# Patient Record
Sex: FEMALE | Race: BLACK OR AFRICAN AMERICAN | NOT HISPANIC OR LATINO | Employment: UNEMPLOYED | URBAN - METROPOLITAN AREA
[De-identification: names, ages, dates, MRNs, and addresses within clinical notes are randomized per-mention and may not be internally consistent; named-entity substitution may affect disease eponyms.]

---

## 2017-08-15 ENCOUNTER — OFFICE VISIT (OUTPATIENT)
Dept: URGENT CARE | Facility: CLINIC | Age: 52
End: 2017-08-15
Payer: COMMERCIAL

## 2017-08-15 VITALS
DIASTOLIC BLOOD PRESSURE: 88 MMHG | OXYGEN SATURATION: 100 % | HEART RATE: 71 BPM | TEMPERATURE: 98 F | SYSTOLIC BLOOD PRESSURE: 146 MMHG | RESPIRATION RATE: 16 BRPM

## 2017-08-15 DIAGNOSIS — K04.7 TOOTH ABSCESS: Primary | ICD-10-CM

## 2017-08-15 PROCEDURE — 99213 OFFICE O/P EST LOW 20 MIN: CPT | Mod: S$GLB,,, | Performed by: FAMILY MEDICINE

## 2017-08-15 RX ORDER — AMOXICILLIN 875 MG/1
875 TABLET, FILM COATED ORAL 2 TIMES DAILY
Qty: 20 TABLET | Refills: 0 | Status: SHIPPED | OUTPATIENT
Start: 2017-08-15 | End: 2017-08-25

## 2017-08-15 NOTE — PROGRESS NOTES
Subjective:       Patient ID: Cassandra Francis is a 52 y.o. female.    Vitals:  oral temperature is 98 °F (36.7 °C). Her blood pressure is 146/88 (abnormal) and her pulse is 71. Her respiration is 16 and oxygen saturation is 100%.     Chief Complaint: Dental Pain (PT C/O LEFT SIDE MAXILLARY PAIN, 2 DAYS, SWELLING, TENDER TO TOUCH, NO RECENT DENTAL PROCEDURES, STATED SHE HAD THE SAME SYMPTOMS 1 MONTH AGO AND WAS TX WITH ANTIBIOTICS, OTC MEDICATION NOT HELPING)    Dental Pain    This is a new problem. The current episode started yesterday. The problem occurs constantly. The problem has been unchanged. Pertinent negatives include no fever. She has tried NSAIDs for the symptoms. The treatment provided no relief.     Review of Systems   Constitution: Negative for chills and fever.   HENT: Negative for headaches and sore throat.    Eyes: Negative for blurred vision.   Cardiovascular: Negative for chest pain.   Respiratory: Negative for shortness of breath.    Skin: Negative for rash.   Musculoskeletal: Negative for back pain and joint pain.   Gastrointestinal: Negative for abdominal pain, diarrhea, nausea and vomiting.   Psychiatric/Behavioral: The patient is not nervous/anxious.        Objective:      Physical Exam   HENT:   Mouth/Throat: Uvula is midline, oropharynx is clear and moist and mucous membranes are normal. No oral lesions. No trismus in the jaw. Dental abscesses present. No uvula swelling or lacerations.           Assessment:       1. Tooth abscess        Plan:         Tooth abscess    Other orders  -     amoxicillin (AMOXIL) 875 MG tablet; Take 1 tablet (875 mg total) by mouth 2 (two) times daily.  Dispense: 20 tablet; Refill: 0

## 2017-12-13 ENCOUNTER — PATIENT OUTREACH (OUTPATIENT)
Dept: ADMINISTRATIVE | Facility: HOSPITAL | Age: 52
End: 2017-12-13

## 2017-12-13 NOTE — PROGRESS NOTES
Health Maintenance Due   Topic Date Due    Pap Smear with HPV Cotest  01/16/2017    Influenza Vaccine  08/01/2017       Pre-visit outreach via mail

## 2017-12-13 NOTE — LETTER
December 13, 2017    Cassandra Francis  [address]  [place] [address]             [facility]  [address]  [facility] [address]  Phone: [phone] Dear Ms. Francis:    [facility] is committed to your overall health.  To help you get the most out of each of your visits, we will review your information to make sure you are up to date on all of your recommended tests and/or procedures.      Dr. Erazo    has found that you may be due for:    Pap smear with HPV Cotest  Influenza vaccine    If you have had any of the above done at another facility, please bring the records or information with you so that your record at [facility] will be complete.     If you are currently taking medication, please bring it with you to your appointment for review.    If you have any questions or concerns, please don't hesitate to call.    Sincerely,    Jeannine Villagomez  Clinical Care Coordinator  [facility] [address] [facility] [address]  [place] [address]  Phone: [phone]   Fax: [phone]

## 2018-03-01 ENCOUNTER — PATIENT OUTREACH (OUTPATIENT)
Dept: ADMINISTRATIVE | Facility: HOSPITAL | Age: 53
End: 2018-03-01

## 2018-03-01 NOTE — PROGRESS NOTES
Health Maintenance Due   Topic Date Due    Pap Smear with HPV Cotest  01/16/2017    Influenza Vaccine  08/01/2017    Mammogram  02/22/2018     Pre-visit outreach via mail

## 2018-03-01 NOTE — LETTER
March 1, 2018    Cassandra Francis  [address]  [place] [address]             [facility]  [address]  [facility] [address]  Phone: [phone] Dear Ms. Francis:    [facility] is committed to your overall health.  To help you get the most out of each of your visits, we will review your information to make sure you are up to date on all of your recommended tests and/or procedures.      Dr. Erazo       has found that you may be due for:    Pap smear with HPV Cotest  Mammogram  Influenza vaccine    If you have had any of the above done at another facility, please bring the records or information with you so that your record at [facility] will be complete.     If you are currently taking medication, please bring it with you to your appointment for review.    If you have any questions or concerns, please don't hesitate to call.    Sincerely,    Jeannine Villagomez  Clinical Care Coordinator  [facility] [address] [facility] [address]  [place] [address]  Phone: [phone]   Fax: [phone]

## 2018-03-15 ENCOUNTER — LAB VISIT (OUTPATIENT)
Dept: LAB | Facility: HOSPITAL | Age: 53
End: 2018-03-15
Attending: FAMILY MEDICINE
Payer: COMMERCIAL

## 2018-03-15 ENCOUNTER — OFFICE VISIT (OUTPATIENT)
Dept: FAMILY MEDICINE | Facility: CLINIC | Age: 53
End: 2018-03-15
Payer: COMMERCIAL

## 2018-03-15 VITALS
BODY MASS INDEX: 33.59 KG/M2 | HEART RATE: 90 BPM | RESPIRATION RATE: 18 BRPM | WEIGHT: 177.94 LBS | SYSTOLIC BLOOD PRESSURE: 120 MMHG | TEMPERATURE: 98 F | OXYGEN SATURATION: 98 % | DIASTOLIC BLOOD PRESSURE: 80 MMHG | HEIGHT: 61 IN

## 2018-03-15 DIAGNOSIS — Z00.00 PREVENTATIVE HEALTH CARE: ICD-10-CM

## 2018-03-15 DIAGNOSIS — Z00.00 PREVENTATIVE HEALTH CARE: Primary | ICD-10-CM

## 2018-03-15 LAB
ALBUMIN SERPL BCP-MCNC: 3.8 G/DL
ALP SERPL-CCNC: 114 U/L
ALT SERPL W/O P-5'-P-CCNC: 20 U/L
ANION GAP SERPL CALC-SCNC: 9 MMOL/L
AST SERPL-CCNC: 19 U/L
BASOPHILS # BLD AUTO: 0.05 K/UL
BASOPHILS NFR BLD: 0.6 %
BILIRUB SERPL-MCNC: 0.5 MG/DL
BUN SERPL-MCNC: 6 MG/DL
CALCIUM SERPL-MCNC: 10.3 MG/DL
CHLORIDE SERPL-SCNC: 104 MMOL/L
CHOLEST SERPL-MCNC: 214 MG/DL
CHOLEST/HDLC SERPL: 3.3 {RATIO}
CO2 SERPL-SCNC: 29 MMOL/L
CREAT SERPL-MCNC: 0.8 MG/DL
DIFFERENTIAL METHOD: NORMAL
EOSINOPHIL # BLD AUTO: 0.1 K/UL
EOSINOPHIL NFR BLD: 1.2 %
ERYTHROCYTE [DISTWIDTH] IN BLOOD BY AUTOMATED COUNT: 12.1 %
EST. GFR  (AFRICAN AMERICAN): >60 ML/MIN/1.73 M^2
EST. GFR  (NON AFRICAN AMERICAN): >60 ML/MIN/1.73 M^2
ESTIMATED AVG GLUCOSE: 105 MG/DL
GLUCOSE SERPL-MCNC: 104 MG/DL
HBA1C MFR BLD HPLC: 5.3 %
HCT VFR BLD AUTO: 38.1 %
HDLC SERPL-MCNC: 64 MG/DL
HDLC SERPL: 29.9 %
HGB BLD-MCNC: 12.7 G/DL
IMM GRANULOCYTES # BLD AUTO: 0.02 K/UL
IMM GRANULOCYTES NFR BLD AUTO: 0.2 %
LDLC SERPL CALC-MCNC: 128.4 MG/DL
LYMPHOCYTES # BLD AUTO: 3 K/UL
LYMPHOCYTES NFR BLD: 34.6 %
MCH RBC QN AUTO: 30.4 PG
MCHC RBC AUTO-ENTMCNC: 33.3 G/DL
MCV RBC AUTO: 91 FL
MONOCYTES # BLD AUTO: 0.5 K/UL
MONOCYTES NFR BLD: 5.3 %
NEUTROPHILS # BLD AUTO: 5.1 K/UL
NEUTROPHILS NFR BLD: 58.1 %
NONHDLC SERPL-MCNC: 150 MG/DL
NRBC BLD-RTO: 0 /100 WBC
PLATELET # BLD AUTO: 313 K/UL
PMV BLD AUTO: 11.4 FL
POTASSIUM SERPL-SCNC: 4.4 MMOL/L
PROT SERPL-MCNC: 7.5 G/DL
RBC # BLD AUTO: 4.18 M/UL
SODIUM SERPL-SCNC: 142 MMOL/L
TRIGL SERPL-MCNC: 108 MG/DL
WBC # BLD AUTO: 8.69 K/UL

## 2018-03-15 PROCEDURE — 99396 PREV VISIT EST AGE 40-64: CPT | Mod: S$GLB,,, | Performed by: FAMILY MEDICINE

## 2018-03-15 PROCEDURE — 36415 COLL VENOUS BLD VENIPUNCTURE: CPT | Mod: PO

## 2018-03-15 PROCEDURE — 99999 PR PBB SHADOW E&M-EST. PATIENT-LVL III: CPT | Mod: PBBFAC,,, | Performed by: FAMILY MEDICINE

## 2018-03-15 PROCEDURE — 80053 COMPREHEN METABOLIC PANEL: CPT

## 2018-03-15 PROCEDURE — 83036 HEMOGLOBIN GLYCOSYLATED A1C: CPT

## 2018-03-15 PROCEDURE — 80061 LIPID PANEL: CPT

## 2018-03-15 PROCEDURE — 85025 COMPLETE CBC W/AUTO DIFF WBC: CPT

## 2018-03-15 NOTE — PROGRESS NOTES
Subjective:       Patient ID: Cassandra Francis is a 53 y.o. female.    Chief Complaint: Preventative health care    Pt here for annual visit.  Has been well without complaints.   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  Pt follows with gynecologist, Dr. Dev Valencia for pap and mammogram.    No past medical history on file.    Past Surgical History:     SECTION, CLASSIC                                      No Known Allergies    Social History    Marital Status:                       Occupational History  Occupation          Employer            Comment               not employed                                Social History Main Topics    Smoking Status: Never Smoker                      Smokeless Status: Never Used                        Alcohol Use: No              Drug Use: No              Sexual Activity: Not on file          Current Outpatient Prescriptions on File Prior to Visit:  MULTIVITAMINS-MIN/FA/GINKGO (WOMEN'S 50+ DAILY FORMULA ORAL), Take by mouth., Disp: , Rfl:     No current facility-administered medications on file prior to visit.    No family history on file.        Review of Systems   Constitutional: Negative for activity change, appetite change, fatigue and unexpected weight change.   HENT: Negative for congestion, dental problem, ear pain, hearing loss, nosebleeds, postnasal drip, sinus pressure and tinnitus.    Eyes: Negative for pain, discharge and visual disturbance.   Respiratory: Negative for cough, choking, chest tightness, shortness of breath and wheezing.    Cardiovascular: Negative for chest pain, palpitations and leg swelling.   Gastrointestinal: Negative for abdominal distention, abdominal pain, blood in stool, constipation, diarrhea, nausea and vomiting.             Genitourinary: Negative.    Musculoskeletal: Negative for arthralgias, back pain, gait problem, joint swelling and myalgias.   Skin: Negative for color change, pallor, rash and wound.  "  Neurological: Negative for dizziness, tremors, syncope, weakness, light-headedness, numbness and headaches.   Hematological: Negative for adenopathy. Does not bruise/bleed easily.   Psychiatric/Behavioral: Negative for agitation, confusion, dysphoric mood and sleep disturbance. The patient is not nervous/anxious.        Current Outpatient Prescriptions on File Prior to Visit   Medication Sig Dispense Refill    MULTIVITAMINS-MIN/FA/GINKGO (WOMEN'S 50+ DAILY FORMULA ORAL) Take by mouth.       No current facility-administered medications on file prior to visit.        Objective:      /80   Pulse 90   Temp 97.9 °F (36.6 °C)   Resp 18   Ht 5' 1" (1.549 m)   Wt 80.7 kg (177 lb 14.6 oz)   LMP 05/14/2012   SpO2 98%   BMI 33.62 kg/m²     Physical Exam   Constitutional: She is oriented to person, place, and time. She appears well-developed and well-nourished.   HENT:   Head: Normocephalic.   Mouth/Throat: Oropharynx is clear and moist. No oropharyngeal exudate or posterior oropharyngeal erythema.   Eyes: Conjunctivae and EOM are normal. Pupils are equal, round, and reactive to light.   Neck: Normal range of motion. Neck supple. No thyromegaly present.   Cardiovascular: Normal rate, regular rhythm, S1 normal, S2 normal, normal heart sounds and intact distal pulses.  Exam reveals no gallop and no friction rub.    No murmur heard.  Pulmonary/Chest: Effort normal and breath sounds normal. She has no wheezes. She has no rales.   Abdominal: Soft. Normal appearance and bowel sounds are normal. She exhibits no distension and no mass. There is no tenderness. There is no rebound and no guarding.   Musculoskeletal:        Right lower leg: She exhibits no edema.        Left lower leg: She exhibits no edema.   Lymphadenopathy:     She has no cervical adenopathy.   Neurological: She is alert and oriented to person, place, and time. No cranial nerve deficit. Gait normal.   Skin: Skin is warm and intact. No rash noted. "   Psychiatric: She has a normal mood and affect.         Results for orders placed or performed in visit on 12/23/16   Comprehensive metabolic panel   Result Value Ref Range    Sodium 142 136 - 145 mmol/L    Potassium 3.8 3.5 - 5.1 mmol/L    Chloride 105 95 - 110 mmol/L    CO2 29 23 - 29 mmol/L    Glucose 102 70 - 110 mg/dL    BUN, Bld 8 6 - 20 mg/dL    Creatinine 0.7 0.5 - 1.4 mg/dL    Calcium 9.9 8.7 - 10.5 mg/dL    Total Protein 7.4 6.0 - 8.4 g/dL    Albumin 3.7 3.5 - 5.2 g/dL    Total Bilirubin 0.5 0.1 - 1.0 mg/dL    Alkaline Phosphatase 104 55 - 135 U/L    AST 16 10 - 40 U/L    ALT 14 10 - 44 U/L    Anion Gap 8 8 - 16 mmol/L    eGFR if African American >60.0 >60 mL/min/1.73 m^2    eGFR if non African American >60.0 >60 mL/min/1.73 m^2   TSH   Result Value Ref Range    TSH 1.825 0.400 - 4.000 uIU/mL   Lipid panel   Result Value Ref Range    Cholesterol 223 (H) 120 - 199 mg/dL    Triglycerides 91 30 - 150 mg/dL    HDL 72 40 - 75 mg/dL    LDL Cholesterol 132.8 63.0 - 159.0 mg/dL    HDL/Chol Ratio 32.3 20.0 - 50.0 %    Total Cholesterol/HDL Ratio 3.1 2.0 - 5.0    Non-HDL Cholesterol 151 mg/dL   CBC auto differential   Result Value Ref Range    WBC 7.21 3.90 - 12.70 K/uL    RBC 3.97 (L) 4.00 - 5.40 M/uL    Hemoglobin 12.1 12.0 - 16.0 g/dL    Hematocrit 36.5 (L) 37.0 - 48.5 %    MCV 92 82 - 98 fL    MCH 30.5 27.0 - 31.0 pg    MCHC 33.2 32.0 - 36.0 %    RDW 13.4 11.5 - 14.5 %    Platelets 321 150 - 350 K/uL    MPV 10.9 9.2 - 12.9 fL    Gran # (ANC) 3.6 1.8 - 7.7 K/uL    Lymph # 3.1 1.0 - 4.8 K/uL    Mono # 0.4 0.3 - 1.0 K/uL    Eos # 0.1 0.0 - 0.5 K/uL    Baso # 0.03 0.00 - 0.20 K/uL    Gran% 50.0 38.0 - 73.0 %    Lymph% 42.6 18.0 - 48.0 %    Mono% 5.3 4.0 - 15.0 %    Eosinophil% 1.4 0.0 - 8.0 %    Basophil% 0.4 0.0 - 1.9 %    Differential Method Automated    Hemoglobin A1c   Result Value Ref Range    Hemoglobin A1C 5.4 4.5 - 6.2 %    Estimated Avg Glucose 108 68 - 131 mg/dL   Hepatitis C antibody   Result Value Ref  Range    Hepatitis C Ab Negative      Assessment:          Plan:        Cassandra was seen today for preventative health care.    Diagnoses and all orders for this visit:    Preventative health care  -     CBC auto differential; Future  -     Comprehensive metabolic panel; Future  -     Lipid panel; Future  -     Hemoglobin A1c; Future       labs today  Counseled on regular exercise, maintenance of a healthy weight, balanced diet rich in fruits/vegetables and lean protein, and avoidance of unhealthy habits like smoking and excessive alcohol intake.

## 2018-03-16 ENCOUNTER — TELEPHONE (OUTPATIENT)
Dept: ADMINISTRATIVE | Facility: HOSPITAL | Age: 53
End: 2018-03-16

## 2018-03-16 LAB — PAP: NORMAL

## 2018-03-16 NOTE — LETTER
March 16, 2018    Dev Valencia MD             [facility]  [address]  [facility] [address]  Phone: [phone] March 16, 2018     Patient: Cassandra Francis    YOB: 1965   Date of Visit: 3/16/2018       To Whom It May Concern:      [facility]    We are seeing Cassandra Francis in the clinic today at [facility].  Haile Erazo MD is their PCP.  She/He has an outstanding lab/procedure at this time when reviewing their chart.  To help with our Health Maintenance records will you please supply the following:      [x]  Pap Smear                                                                                                  Please Fax to [facility] at [phone]    Thank you for your help, Jeannine Villagomez LPN-[facility].  If I can be of any assistance you can call at [phone]      If you have any questions or concerns, please don't hesitate to call.    Sincerely,        Haile Erazo MD

## 2018-03-16 NOTE — LETTER
March 20, 2018    Cassandra Francis  [address]  [place] [address]             [facility]  [address]  [facility] [address]  Phone: [phone] Dear Ms. Francis:    [facility] is committed to your overall health.  To help you get the most out of each of your visits, we will review your information to make sure you are up to date on all of your recommended tests and/or procedures.      Dr. Erazo had me request your Pap smear records from Dr. Valencia, but have yet to receive anything from them.  Sometimes these facilities require a signed release before they will share records.  If you have already signed a release of information form, would you mind contacting them to send us the records.  If not, the next time you are in the office, we will have you sign a Release of Information and try again.      Please contact his office  and request records to be faxed to ( Fax: [phone])        If you have any questions or concerns, please don't hesitate to call.    Sincerely,    Jeannine Villagomez  Clinical Care Coordinator  [facility]  [address] [facility] [address]  [place] [address]  Phone: [phone]   Fax: [phone]

## 2018-03-26 ENCOUNTER — TELEPHONE (OUTPATIENT)
Dept: FAMILY MEDICINE | Facility: CLINIC | Age: 53
End: 2018-03-26

## 2018-03-26 NOTE — TELEPHONE ENCOUNTER
----- Message from Mabel Cage sent at 3/26/2018 10:25 AM CDT -----  Contact: self  Patient is requesting the results of her labs that she had done a couple of weeks ago.  Call back at [phone] (home).  Thank you!

## 2019-10-24 ENCOUNTER — OFFICE VISIT (OUTPATIENT)
Dept: FAMILY MEDICINE | Facility: CLINIC | Age: 54
End: 2019-10-24
Payer: COMMERCIAL

## 2019-10-24 ENCOUNTER — LAB VISIT (OUTPATIENT)
Dept: LAB | Facility: HOSPITAL | Age: 54
End: 2019-10-24
Attending: FAMILY MEDICINE

## 2019-10-24 VITALS
OXYGEN SATURATION: 97 % | HEART RATE: 75 BPM | HEIGHT: 61 IN | TEMPERATURE: 98 F | DIASTOLIC BLOOD PRESSURE: 80 MMHG | WEIGHT: 169.75 LBS | BODY MASS INDEX: 32.05 KG/M2 | SYSTOLIC BLOOD PRESSURE: 124 MMHG

## 2019-10-24 DIAGNOSIS — Z00.00 PREVENTATIVE HEALTH CARE: ICD-10-CM

## 2019-10-24 DIAGNOSIS — Z00.00 PREVENTATIVE HEALTH CARE: Primary | ICD-10-CM

## 2019-10-24 LAB
ALBUMIN SERPL BCP-MCNC: 4 G/DL (ref 3.5–5.2)
ALP SERPL-CCNC: 109 U/L (ref 55–135)
ALT SERPL W/O P-5'-P-CCNC: 11 U/L (ref 10–44)
ANION GAP SERPL CALC-SCNC: 9 MMOL/L (ref 8–16)
AST SERPL-CCNC: 16 U/L (ref 10–40)
BASOPHILS # BLD AUTO: 0.04 K/UL (ref 0–0.2)
BASOPHILS NFR BLD: 0.6 % (ref 0–1.9)
BILIRUB SERPL-MCNC: 0.4 MG/DL (ref 0.1–1)
BUN SERPL-MCNC: 6 MG/DL (ref 6–20)
CALCIUM SERPL-MCNC: 10.3 MG/DL (ref 8.7–10.5)
CHLORIDE SERPL-SCNC: 104 MMOL/L (ref 95–110)
CHOLEST SERPL-MCNC: 216 MG/DL (ref 120–199)
CHOLEST/HDLC SERPL: 3.5 {RATIO} (ref 2–5)
CO2 SERPL-SCNC: 29 MMOL/L (ref 23–29)
CREAT SERPL-MCNC: 0.8 MG/DL (ref 0.5–1.4)
DIFFERENTIAL METHOD: NORMAL
EOSINOPHIL # BLD AUTO: 0.1 K/UL (ref 0–0.5)
EOSINOPHIL NFR BLD: 0.9 % (ref 0–8)
ERYTHROCYTE [DISTWIDTH] IN BLOOD BY AUTOMATED COUNT: 12.8 % (ref 11.5–14.5)
EST. GFR  (AFRICAN AMERICAN): >60 ML/MIN/1.73 M^2
EST. GFR  (NON AFRICAN AMERICAN): >60 ML/MIN/1.73 M^2
ESTIMATED AVG GLUCOSE: 108 MG/DL (ref 68–131)
GLUCOSE SERPL-MCNC: 96 MG/DL (ref 70–110)
HBA1C MFR BLD HPLC: 5.4 % (ref 4–5.6)
HCT VFR BLD AUTO: 41.3 % (ref 37–48.5)
HDLC SERPL-MCNC: 62 MG/DL (ref 40–75)
HDLC SERPL: 28.7 % (ref 20–50)
HGB BLD-MCNC: 13.2 G/DL (ref 12–16)
IMM GRANULOCYTES # BLD AUTO: 0.02 K/UL (ref 0–0.04)
IMM GRANULOCYTES NFR BLD AUTO: 0.3 % (ref 0–0.5)
LDLC SERPL CALC-MCNC: 130.4 MG/DL (ref 63–159)
LYMPHOCYTES # BLD AUTO: 2.3 K/UL (ref 1–4.8)
LYMPHOCYTES NFR BLD: 32.8 % (ref 18–48)
MCH RBC QN AUTO: 29.9 PG (ref 27–31)
MCHC RBC AUTO-ENTMCNC: 32 G/DL (ref 32–36)
MCV RBC AUTO: 94 FL (ref 82–98)
MONOCYTES # BLD AUTO: 0.3 K/UL (ref 0.3–1)
MONOCYTES NFR BLD: 4 % (ref 4–15)
NEUTROPHILS # BLD AUTO: 4.3 K/UL (ref 1.8–7.7)
NEUTROPHILS NFR BLD: 61.4 % (ref 38–73)
NONHDLC SERPL-MCNC: 154 MG/DL
NRBC BLD-RTO: 0 /100 WBC
PLATELET # BLD AUTO: 319 K/UL (ref 150–350)
PMV BLD AUTO: 11.6 FL (ref 9.2–12.9)
POTASSIUM SERPL-SCNC: 3.9 MMOL/L (ref 3.5–5.1)
PROT SERPL-MCNC: 7.9 G/DL (ref 6–8.4)
RBC # BLD AUTO: 4.41 M/UL (ref 4–5.4)
SODIUM SERPL-SCNC: 142 MMOL/L (ref 136–145)
TRIGL SERPL-MCNC: 118 MG/DL (ref 30–150)
TSH SERPL DL<=0.005 MIU/L-ACNC: 1.21 UIU/ML (ref 0.4–4)
WBC # BLD AUTO: 6.96 K/UL (ref 3.9–12.7)

## 2019-10-24 PROCEDURE — 36415 COLL VENOUS BLD VENIPUNCTURE: CPT | Mod: PO

## 2019-10-24 PROCEDURE — 99999 PR PBB SHADOW E&M-EST. PATIENT-LVL III: ICD-10-PCS | Mod: PBBFAC,,, | Performed by: FAMILY MEDICINE

## 2019-10-24 PROCEDURE — 99396 PREV VISIT EST AGE 40-64: CPT | Mod: S$GLB,,, | Performed by: FAMILY MEDICINE

## 2019-10-24 PROCEDURE — 80061 LIPID PANEL: CPT

## 2019-10-24 PROCEDURE — 99396 PR PREVENTIVE VISIT,EST,40-64: ICD-10-PCS | Mod: S$GLB,,, | Performed by: FAMILY MEDICINE

## 2019-10-24 PROCEDURE — 85025 COMPLETE CBC W/AUTO DIFF WBC: CPT

## 2019-10-24 PROCEDURE — 99999 PR PBB SHADOW E&M-EST. PATIENT-LVL III: CPT | Mod: PBBFAC,,, | Performed by: FAMILY MEDICINE

## 2019-10-24 PROCEDURE — 80053 COMPREHEN METABOLIC PANEL: CPT

## 2019-10-24 PROCEDURE — 84443 ASSAY THYROID STIM HORMONE: CPT

## 2019-10-24 PROCEDURE — 83036 HEMOGLOBIN GLYCOSYLATED A1C: CPT

## 2019-10-24 NOTE — PROGRESS NOTES
Subjective:       Patient ID: Cassandra Francis is a 54 y.o. female.    Chief Complaint: Annual Exam    Pt here for annual visit.  Has been well without complaints.     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  Pt follows with gynecologist, Dr. Dev Valencia for pap and mammogram.    No past medical history on file.    Past Surgical History:     SECTION, CLASSIC                                      No Known Allergies    Social History    Marital Status:                       Occupational History  Occupation          Employer            Comment               not employed                                Social History Main Topics    Smoking Status: Never Smoker                      Smokeless Status: Never Used                        Alcohol Use: No              Drug Use: No              Sexual Activity: Not on file          Current Outpatient Prescriptions on File Prior to Visit:  MULTIVITAMINS-MIN/FA/GINKGO (WOMEN'S 50+ DAILY FORMULA ORAL), Take by mouth., Disp: , Rfl:     No current facility-administered medications on file prior to visit.    No family history on file.      Review of Systems   Constitutional: Negative for activity change, appetite change, fatigue and unexpected weight change.   HENT: Negative for congestion, dental problem, ear pain, hearing loss, nosebleeds, postnasal drip, sinus pressure and tinnitus.    Eyes: Negative for pain, discharge and visual disturbance.   Respiratory: Negative for cough, choking, chest tightness, shortness of breath and wheezing.    Cardiovascular: Negative for chest pain, palpitations and leg swelling.   Gastrointestinal: Negative for abdominal distention, abdominal pain, blood in stool, constipation, diarrhea, nausea and vomiting.             Genitourinary: Negative.    Musculoskeletal: Negative for arthralgias, back pain, gait problem, joint swelling and myalgias.   Skin: Negative for color change, pallor, rash and wound.  "  Neurological: Negative for dizziness, tremors, syncope, weakness, light-headedness, numbness and headaches.   Hematological: Negative for adenopathy. Does not bruise/bleed easily.   Psychiatric/Behavioral: Negative for agitation, confusion, dysphoric mood and sleep disturbance. The patient is not nervous/anxious.        Current Outpatient Medications on File Prior to Visit   Medication Sig Dispense Refill    MULTIVITAMINS-MIN/FA/GINKGO (WOMEN'S 50+ DAILY FORMULA ORAL) Take by mouth.       No current facility-administered medications on file prior to visit.        Objective:      /80   Pulse 75   Temp 98.2 °F (36.8 °C) (Oral)   Ht 5' 1" (1.549 m)   Wt 77 kg (169 lb 12.1 oz)   LMP 05/14/2012   SpO2 97%   BMI 32.07 kg/m²     Physical Exam   Constitutional: She is oriented to person, place, and time. She appears well-developed and well-nourished.   HENT:   Head: Normocephalic.   Mouth/Throat: Oropharynx is clear and moist. No oropharyngeal exudate or posterior oropharyngeal erythema.   Eyes: Pupils are equal, round, and reactive to light. Conjunctivae and EOM are normal.   Neck: Normal range of motion. Neck supple. No thyromegaly present.   Cardiovascular: Normal rate, regular rhythm, S1 normal, S2 normal, normal heart sounds and intact distal pulses. Exam reveals no gallop and no friction rub.   No murmur heard.  Pulmonary/Chest: Effort normal and breath sounds normal. She has no wheezes. She has no rales.   Abdominal: Soft. Normal appearance and bowel sounds are normal. She exhibits no distension and no mass. There is no tenderness. There is no rebound and no guarding.   Musculoskeletal:        Right lower leg: She exhibits no edema.        Left lower leg: She exhibits no edema.   Lymphadenopathy:     She has no cervical adenopathy.   Neurological: She is alert and oriented to person, place, and time. No cranial nerve deficit. Gait normal.   Skin: Skin is warm and intact. No rash noted.   Psychiatric: " She has a normal mood and affect.         Results for orders placed or performed in visit on 03/16/18    PAP SMEAR   Result Value Ref Range    PAP       Assessment:          Plan:        Cassandra was seen today for annual exam.    Diagnoses and all orders for this visit:    Preventative health care  -     Comprehensive metabolic panel; Future  -     Lipid panel; Future  -     Hemoglobin A1c; Future  -     TSH; Future  -     CBC auto differential; Future     shingrix at phamacy  labs today  Counseled on regular exercise, maintenance of a healthy weight, balanced diet rich in fruits/vegetables and lean protein, and avoidance of unhealthy habits like smoking and excessive alcohol intake.

## 2019-10-25 ENCOUNTER — TELEPHONE (OUTPATIENT)
Dept: FAMILY MEDICINE | Facility: CLINIC | Age: 54
End: 2019-10-25

## 2019-10-25 NOTE — TELEPHONE ENCOUNTER
Call placed to [employer], and then to patient, advised that we do take [employer] and she will still be seen by Dr Erazo.

## 2019-10-25 NOTE — TELEPHONE ENCOUNTER
----- Message from Merced Ring sent at 10/25/2019  2:04 PM CDT -----    Type: Needs Medical Advice    Who Called:   Haven Cobb with    Rep  for [employer]   Best Call Back Number: [phone]  Additional Information:   Calling to  See if  The  Dr  Will  Be taking  [employer]  Or will  Their  Be a   Change   // unable to  Find   The Dr in  Contract

## 2019-10-31 ENCOUNTER — TELEPHONE (OUTPATIENT)
Dept: FAMILY MEDICINE | Facility: CLINIC | Age: 54
End: 2019-10-31

## 2019-10-31 NOTE — TELEPHONE ENCOUNTER
----- Message from Maria Teresa Jean sent at 10/31/2019  1:14 PM CDT -----  Contact: patient   Patient need to speak with a nurse regarding results, please call back at [phone] (home)

## 2019-11-13 ENCOUNTER — TELEPHONE (OUTPATIENT)
Dept: FAMILY MEDICINE | Facility: CLINIC | Age: 54
End: 2019-11-13

## 2019-11-13 NOTE — TELEPHONE ENCOUNTER
Patient needs labs mailed to her.   Please print labs from 10/24 and place those in the mail.

## 2020-02-29 ENCOUNTER — OFFICE VISIT (OUTPATIENT)
Dept: URGENT CARE | Facility: CLINIC | Age: 55
End: 2020-02-29
Payer: COMMERCIAL

## 2020-02-29 VITALS
DIASTOLIC BLOOD PRESSURE: 93 MMHG | SYSTOLIC BLOOD PRESSURE: 162 MMHG | RESPIRATION RATE: 16 BRPM | HEIGHT: 61 IN | BODY MASS INDEX: 31.91 KG/M2 | WEIGHT: 169 LBS | HEART RATE: 84 BPM | OXYGEN SATURATION: 99 % | TEMPERATURE: 99 F

## 2020-02-29 DIAGNOSIS — N30.00 ACUTE CYSTITIS WITHOUT HEMATURIA: Primary | ICD-10-CM

## 2020-02-29 LAB
BILIRUB UR QL STRIP: NEGATIVE
GLUCOSE UR QL STRIP: NEGATIVE
KETONES UR QL STRIP: NEGATIVE
LEUKOCYTE ESTERASE UR QL STRIP: POSITIVE
PH, POC UA: 8 (ref 5–8)
POC BLOOD, URINE: NEGATIVE
POC NITRATES, URINE: NEGATIVE
PROT UR QL STRIP: NEGATIVE
SP GR UR STRIP: 1 (ref 1–1.03)
UROBILINOGEN UR STRIP-ACNC: NORMAL (ref 0.1–1.1)

## 2020-02-29 PROCEDURE — 81003 POCT URINALYSIS, DIPSTICK, AUTOMATED, W/O SCOPE: ICD-10-PCS | Mod: QW,S$GLB,, | Performed by: FAMILY MEDICINE

## 2020-02-29 PROCEDURE — 99214 OFFICE O/P EST MOD 30 MIN: CPT | Mod: 25,S$GLB,, | Performed by: FAMILY MEDICINE

## 2020-02-29 PROCEDURE — 99214 PR OFFICE/OUTPT VISIT, EST, LEVL IV, 30-39 MIN: ICD-10-PCS | Mod: 25,S$GLB,, | Performed by: FAMILY MEDICINE

## 2020-02-29 PROCEDURE — 81003 URINALYSIS AUTO W/O SCOPE: CPT | Mod: QW,S$GLB,, | Performed by: FAMILY MEDICINE

## 2020-02-29 RX ORDER — NITROFURANTOIN 25; 75 MG/1; MG/1
100 CAPSULE ORAL 2 TIMES DAILY
Qty: 10 CAPSULE | Refills: 0 | Status: SHIPPED | OUTPATIENT
Start: 2020-02-29 | End: 2020-03-05

## 2020-02-29 NOTE — PATIENT INSTRUCTIONS

## 2020-02-29 NOTE — PROGRESS NOTES
"Subjective:       Patient ID: Cassandra Francis is a 55 y.o. female.    Vitals:  height is 5' 1" (1.549 m) and weight is 76.7 kg (169 lb). Her oral temperature is 98.7 °F (37.1 °C). Her blood pressure is 162/93 (abnormal) and her pulse is 84. Her respiration is 16 and oxygen saturation is 99%.     Chief Complaint: Urinary Tract Infection    Pt present today with UTI, pt stated that it burns when using the Bathroom and she noticed a little blood in her urine. Symptoms started last night.     Urinary Tract Infection    This is a new problem. The current episode started yesterday. The problem has been gradually worsening. The quality of the pain is described as burning. The pain is at a severity of 4/10. The pain is mild. There has been no fever. Associated symptoms include flank pain. Pertinent negatives include no behavior changes, chills, discharge, frequency, hematuria, hesitancy, nausea, possible pregnancy, sweats, urgency, vomiting, weight loss, bubble bath use, constipation, rash or withholding. She has tried nothing for the symptoms. There is no history of catheterization, diabetes insipidus, diabetes mellitus, genitourinary reflux, hypertension, kidney stones, recurrent UTIs, a single kidney, STD, urinary stasis or a urological procedure.       Constitution: Negative for chills.   Gastrointestinal: Negative for nausea, vomiting and constipation.   Genitourinary: Positive for dysuria and flank pain. Negative for frequency, urgency and hematuria.   Skin: Negative for rash.       Objective:      Physical Exam   Constitutional: She is oriented to person, place, and time. She appears well-developed and well-nourished.   HENT:   Head: Normocephalic and atraumatic.   Right Ear: External ear normal.   Left Ear: External ear normal.   Nose: Nose normal.   Mouth/Throat: Mucous membranes are normal.   Eyes: Conjunctivae and lids are normal.   Neck: Trachea normal and full passive range of motion without pain. Neck supple. "   Cardiovascular: Normal rate, regular rhythm and normal heart sounds.   Pulmonary/Chest: Effort normal and breath sounds normal. No respiratory distress.   Abdominal: Soft. Normal appearance and bowel sounds are normal. She exhibits no distension, no abdominal bruit, no pulsatile midline mass and no mass. There is no tenderness.   Mild direct suprapubic tenderness without rebound   Musculoskeletal: Normal range of motion. She exhibits no edema.   Neurological: She is alert and oriented to person, place, and time. She has normal strength.   Skin: Skin is warm, dry, intact, not diaphoretic and not pale.   Psychiatric: She has a normal mood and affect. Her speech is normal and behavior is normal. Judgment and thought content normal. Cognition and memory are normal.   Nursing note and vitals reviewed.        Assessment:       1. Acute cystitis without hematuria        Plan:         Acute cystitis without hematuria  -     POCT Urinalysis, Dipstick, Automated, W/O Scope    Other orders  -     nitrofurantoin, macrocrystal-monohydrate, (MACROBID) 100 MG capsule; Take 1 capsule (100 mg total) by mouth 2 (two) times daily. for 5 days  Dispense: 10 capsule; Refill: 0

## 2020-03-03 ENCOUNTER — TELEPHONE (OUTPATIENT)
Dept: URGENT CARE | Facility: CLINIC | Age: 55
End: 2020-03-03

## 2020-09-03 DIAGNOSIS — Z12.39 BREAST CANCER SCREENING: ICD-10-CM

## 2020-10-26 ENCOUNTER — IMMUNIZATION (OUTPATIENT)
Dept: PHARMACY | Facility: CLINIC | Age: 55
End: 2020-10-26
Payer: COMMERCIAL

## 2020-10-26 ENCOUNTER — OFFICE VISIT (OUTPATIENT)
Dept: FAMILY MEDICINE | Facility: CLINIC | Age: 55
End: 2020-10-26
Payer: COMMERCIAL

## 2020-10-26 ENCOUNTER — HOSPITAL ENCOUNTER (OUTPATIENT)
Dept: RADIOLOGY | Facility: HOSPITAL | Age: 55
Discharge: HOME | End: 2020-10-26
Attending: FAMILY MEDICINE
Payer: COMMERCIAL

## 2020-10-26 VITALS
TEMPERATURE: 98 F | WEIGHT: 171.31 LBS | HEART RATE: 88 BPM | BODY MASS INDEX: 32.34 KG/M2 | HEIGHT: 61 IN | SYSTOLIC BLOOD PRESSURE: 130 MMHG | DIASTOLIC BLOOD PRESSURE: 84 MMHG | OXYGEN SATURATION: 98 %

## 2020-10-26 DIAGNOSIS — M75.02 ADHESIVE CAPSULITIS OF LEFT SHOULDER: ICD-10-CM

## 2020-10-26 DIAGNOSIS — Z00.00 PREVENTATIVE HEALTH CARE: Primary | ICD-10-CM

## 2020-10-26 PROCEDURE — 99396 PR PREVENTIVE VISIT,EST,40-64: ICD-10-PCS | Mod: S$GLB,,, | Performed by: FAMILY MEDICINE

## 2020-10-26 PROCEDURE — 3008F BODY MASS INDEX DOCD: CPT | Mod: CPTII,S$GLB,, | Performed by: FAMILY MEDICINE

## 2020-10-26 PROCEDURE — 73030 XR SHOULDER COMPLETE 2 OR MORE VIEWS LEFT: ICD-10-PCS | Mod: 26,LT,, | Performed by: RADIOLOGY

## 2020-10-26 PROCEDURE — 73030 X-RAY EXAM OF SHOULDER: CPT | Mod: TC,FY,PO,LT

## 2020-10-26 PROCEDURE — 73030 X-RAY EXAM OF SHOULDER: CPT | Mod: 26,LT,, | Performed by: RADIOLOGY

## 2020-10-26 PROCEDURE — 99999 PR PBB SHADOW E&M-EST. PATIENT-LVL III: ICD-10-PCS | Mod: PBBFAC,,, | Performed by: FAMILY MEDICINE

## 2020-10-26 PROCEDURE — 3008F PR BODY MASS INDEX (BMI) DOCUMENTED: ICD-10-PCS | Mod: CPTII,S$GLB,, | Performed by: FAMILY MEDICINE

## 2020-10-26 PROCEDURE — 99999 PR PBB SHADOW E&M-EST. PATIENT-LVL III: CPT | Mod: PBBFAC,,, | Performed by: FAMILY MEDICINE

## 2020-10-26 PROCEDURE — 99396 PREV VISIT EST AGE 40-64: CPT | Mod: S$GLB,,, | Performed by: FAMILY MEDICINE

## 2020-10-26 NOTE — PROGRESS NOTES
Subjective:       Patient ID: Cassandra Francis is a 55 y.o. female.    Chief Complaint: Annual Exam    Pt here for annual visit.      C/o 2 weeks of left shoulder pain. It is worse in certain positions.    Pt follows with gynecologist, Dr. Dev Valencia for pap and mammogram.    No past medical history on file.    Past Surgical History:     SECTION, CLASSIC                                      No Known Allergies    Social History    Marital Status:                       Occupational History  Occupation          Employer            Comment               not employed                                Social History Main Topics    Smoking Status: Never Smoker                      Smokeless Status: Never Used                        Alcohol Use: No              Drug Use: No              Sexual Activity: Not on file          Current Outpatient Prescriptions on File Prior to Visit:  MULTIVITAMINS-MIN/FA/GINKGO (WOMEN'S 50+ DAILY FORMULA ORAL), Take by mouth., Disp: , Rfl:     No current facility-administered medications on file prior to visit.    No family history on file.      Review of Systems   Constitutional: Negative for activity change, appetite change, fatigue and unexpected weight change.   HENT: Negative for congestion, dental problem, ear pain, hearing loss, nosebleeds, postnasal drip, sinus pressure and tinnitus.    Eyes: Negative for pain, discharge and visual disturbance.   Respiratory: Negative for cough, choking, chest tightness, shortness of breath and wheezing.    Cardiovascular: Negative for chest pain, palpitations and leg swelling.   Gastrointestinal: Negative for abdominal distention, abdominal pain, blood in stool, constipation, diarrhea, nausea and vomiting.             Genitourinary: Negative.    Musculoskeletal: Positive for arthralgias (left shoulder). Negative for back pain, gait problem, joint swelling and myalgias.   Skin: Negative for color change, pallor, rash and wound.  "  Neurological: Negative for dizziness, tremors, syncope, weakness, light-headedness, numbness and headaches.   Hematological: Negative for adenopathy. Does not bruise/bleed easily.   Psychiatric/Behavioral: Negative for agitation, confusion, dysphoric mood and sleep disturbance. The patient is not nervous/anxious.            Objective:      /84 (BP Location: Left arm, Patient Position: Sitting)   Pulse 88   Temp 98.1 °F (36.7 °C) (Oral)   Ht 5' 1" (1.549 m)   Wt 77.7 kg (171 lb 4.8 oz)   LMP 05/14/2012   SpO2 98%   BMI 32.37 kg/m²     Physical Exam   Constitutional: She is oriented to person, place, and time. She appears well-developed and well-nourished.   HENT:   Head: Normocephalic.   Mouth/Throat: Oropharynx is clear and moist. No oropharyngeal exudate or posterior oropharyngeal erythema.   Eyes: Pupils are equal, round, and reactive to light. Conjunctivae and EOM are normal.   Neck: Normal range of motion. Neck supple. No thyromegaly present.   Cardiovascular: Normal rate, regular rhythm, S1 normal, S2 normal, normal heart sounds and intact distal pulses. Exam reveals no gallop and no friction rub.   No murmur heard.  Pulmonary/Chest: Effort normal and breath sounds normal. She has no wheezes. She has no rales.   Abdominal: Soft. Normal appearance and bowel sounds are normal. She exhibits no distension and no mass. There is no abdominal tenderness. There is no rebound and no guarding.   Musculoskeletal:      Left shoulder: She exhibits decreased range of motion and tenderness. She exhibits no deformity.      Right lower leg: No edema.      Left lower leg: No edema.   Lymphadenopathy:     She has no cervical adenopathy.   Neurological: She is alert and oriented to person, place, and time. No cranial nerve deficit. Gait normal.   Skin: Skin is warm and intact. No rash noted.   Psychiatric: She has a normal mood and affect.           Assessment:          Plan:        Cassandra was seen today for " annual exam.    Diagnoses and all orders for this visit:    Preventative health care  -     Comprehensive Metabolic Panel; Future  -     CBC auto differential; Future  -     Lipid Panel; Future  -     Hemoglobin A1C; Future  -     TSH; Future  -     HIV 1/2 Ag/Ab (4th Gen); Future    Adhesive capsulitis of left shoulder  -     X-Ray Shoulder 2 or More Views Left; Future  -     Ambulatory referral/consult to Physical/Occupational Therapy; Future     shingrix at phamacy  labs today  Shoulder xray and PT referral; basic home shoulder stretching  Counseled on regular exercise, maintenance of a healthy weight, balanced diet rich in fruits/vegetables and lean protein, and avoidance of unhealthy habits like smoking and excessive alcohol intake.

## 2020-10-27 ENCOUNTER — CLINICAL SUPPORT (OUTPATIENT)
Dept: REHABILITATION | Facility: HOSPITAL | Age: 55
End: 2020-10-27
Attending: FAMILY MEDICINE
Payer: COMMERCIAL

## 2020-10-27 DIAGNOSIS — M75.02 ADHESIVE CAPSULITIS OF LEFT SHOULDER: ICD-10-CM

## 2020-10-27 DIAGNOSIS — M25.612 DECREASED RANGE OF MOTION OF LEFT SHOULDER: ICD-10-CM

## 2020-10-27 DIAGNOSIS — R29.898 WEAKNESS OF SHOULDER: ICD-10-CM

## 2020-10-27 PROCEDURE — 97110 THERAPEUTIC EXERCISES: CPT | Mod: PO | Performed by: PHYSICAL THERAPIST

## 2020-10-27 PROCEDURE — 97162 PT EVAL MOD COMPLEX 30 MIN: CPT | Mod: PO | Performed by: PHYSICAL THERAPIST

## 2020-10-27 NOTE — PLAN OF CARE
[facility]  Physical Therapy Initial Evaluation    Name: Cassandra Francis  Clinic Number: [id number]    Therapy Diagnosis:   Encounter Diagnoses   Name Primary?    Adhesive capsulitis of left shoulder     Weakness of shoulder     Decreased range of motion of left shoulder      Physician: Haile Erazo MD    Physician Orders: PT Eval and Treat   Post Surgical? No Eval and Treat Yes Type of Therapy Outpatient Therapy Location: Shoulder Athlete No Dry Needling No   Medical Diagnosis from Referral: M75.02 (ICD-10-CM) - Adhesive capsulitis of left shoulder   Evaluation Date: 10/27/2020  Authorization Period Expiration: 10/26/2021  Plan of Care Expiration: 2020  Visit # / Visits authorized:     Time In: 1204  Time Out: 1241  Total Billable Time: 38 minutes    Precautions: Standard    Subjective   Date of onset: 2 weeks  History of current condition - Cassandra reports: 2 weeks L shoulder pain. She is R hand dominant. This L shoulder pn started insidious without known ROBI. The L shoulder hurts with reaching to don bra and over head.     Medical History:   Past Medical History:   Diagnosis Date    Colon polyp     Diverticulosis        Surgical History:   Cassandra Francis  has a past surgical history that includes  section, classic.    Medications:   Cassandra has a current medication list which includes the following prescription(s): shingrix (pf).    Allergies:   Review of patient's allergies indicates:  No Known Allergies     Imaging, Details    Reading Physician Reading Date Result Priority   Teja Renee IV, MD  [phone] 10/26/2020 Routine      Narrative & Impression     EXAMINATION:  XR SHOULDER COMPLETE 2 OR MORE VIEWS LEFT     CLINICAL HISTORY:  Adhesive capsulitis of left shoulder     TECHNIQUE:  Two or three views of the left shoulder were performed.     COMPARISON:  None     FINDINGS:  No obvious fracture or dislocation is noted.  Osseous structures  appear well mineralized and well aligned.     Impression:     See above         Prior Therapy: none  Social History:  lives with their spouse  Occupation: not working  Prior Level of Function: (I) with all ADLs without L shoulder pain, maybe pain once for short period.  Current Level of Function: pain with reaching and compensating    Pain:  Current 8/10, worst 8/10, best 0/10   Location: left shoulder   Description: Aching, Dull and Sharp  Aggravating Factors: Flexing and reaching over head  Easing Factors: heating pad, rest and essential oil    Pts goals: to dec pain and improve mobility.    Objective     Posture: FHP, RS    Active Range of Motion: Measured in degrees    Shoulder Left Right   Flexion 112 153   Abduction 60 180   ER over head Top of shoulder T4        IR post reach To lat hip L1     Upper Extremity Strength    Shoulder Left Right   Shoulder flexion: 3/5 5/5   Shoulder Abduction: 3-/5 5/5   Shoulder ER 4-/5 5/5   Shoulder IR 4-/5 5/5     Special Tests:     Shoulder Left   AC Joint Compression Test Negative           Subscaputlaris Lift Off Unable to achieve position       O'vivian's test Positive   Hawkin's Helio Positive     Scapular Control/Dyskinesis:    Normal / Subtle / Obvious  Comments    Left  obvious Dec GHJ and scapular dissociation    Right  normal na     Palpation Shoulder:  Pos L teres minor>infraspinatus    Mobility: decreased pattern PROM accessory in abd>ER>IR    CMS Impairment/Limitation/Restriction for FOTO SHOULDER Survey    Therapist reviewed FOTO scores for Cassandra Francis on 10/27/2020.   FOTO documents entered into [employer] - see Media section.    Limitation Score: 51%         TREATMENT   Treatment Time In: 1230  Treatment Time Out: 1240  Total Treatment time separate from Evaluation: 10 minutes    Cassandra received therapeutic exercises to develop ROM for 10 minutes including:  AAROM L shoulder with wand x10 each flex, ER, abd    Home Exercises and Patient Education  Provided    Education provided:   - HEP  - Pt/family was provided educational information, including: role of PT, role of pt/caregiver, goals for PT, POC, scheduling, and attendance policy.- pt verbalized understanding.    Written Home Exercises Provided: yes.  Exercises were reviewed and Cassandra was able to demonstrate them prior to the end of the session.  Cassandra demonstrated good  understanding of the education provided.     See EMR under Patient Instructions for exercises provided 10/27/2020.    Assessment   Cassandra is a 55 y.o. female referred to outpatient Physical Therapy with a medical diagnosis of M75.02 (ICD-10-CM) - Adhesive capsulitis of left shoulder. Pt presents with Impaired L shoulder AROM and PROM accessory in abd>ER>IR. She also has impaired L shoulder strength and mobility.    Pt prognosis is Good.   Pt will benefit from skilled outpatient Physical Therapy to address the deficits stated above and in the chart below, provide pt/family education, and to maximize pt's level of independence.     Plan of care discussed with patient: Yes  Pt's spiritual, cultural and educational needs considered and patient is agreeable to the plan of care and goals as stated below:     Anticipated Barriers for therapy: fear avoidance    Medical Necessity is demonstrated by the following  History  Co-morbidities and personal factors that may impact the plan of care Co-morbidities:   see below    Past Medical History:   Diagnosis Date    Colon polyp     Diverticulosis      Personal Factors:   age     moderate   Examination  Body Structures and Functions, activity limitations and participation restrictions that may impact the plan of care Body Regions:   upper extremities    Body Systems:    ROM  strength  motor control  motor learning    Participation Restrictions:   Impaired dressing and grooming    Activity limitations:   Learning and applying knowledge  no deficits    General Tasks and Commands  no  deficits    Communication  no deficits    Mobility  lifting and carrying objects    Self care  dressing    Domestic Life  doing house work (cleaning house, washing dishes, laundry)    Interactions/Relationships  no deficits    Life Areas  no deficits    Community and Social Life  no deficits         high   Clinical Presentation evolving clinical presentation with changing clinical characteristics moderate   Decision Making/ Complexity Score: moderate     Goals:  Short Term Goals: 4 weeks   -Improve L shoulder AROM flex to 120 deg for improved reaching.  -Improve L shoulder AROM abd to 100 deg for improving reaching  -L shoulder strength grossly 4/5.  -Improve FOTO impairment score to 42% for improved QOL.    Long Term Goals: 8 weeks   -L shoulder AROM within 10 deg of contralateral side.  -L shoulder strength grossly 4+/5, for dressing.  -Improve FOTO impairment score to 35% or better for improved QOL.    Plan   Plan of care Certification: 10/27/2020 to 12/25/2020.    Outpatient Physical Therapy 2 times weekly for 8 weeks to include the following interventions: Electrical Stimulation IFC, Manual Therapy, Moist Heat/ Ice, Patient Education, Self Care, Therapeutic Activites, Therapeutic Exercise and Ultrasound.     Sabrina Escobar, PT

## 2020-10-29 ENCOUNTER — CLINICAL SUPPORT (OUTPATIENT)
Dept: REHABILITATION | Facility: HOSPITAL | Age: 55
End: 2020-10-29
Attending: FAMILY MEDICINE
Payer: COMMERCIAL

## 2020-10-29 DIAGNOSIS — R29.898 WEAKNESS OF SHOULDER: Primary | ICD-10-CM

## 2020-10-29 DIAGNOSIS — M25.612 DECREASED RANGE OF MOTION OF LEFT SHOULDER: ICD-10-CM

## 2020-10-29 PROCEDURE — 97140 MANUAL THERAPY 1/> REGIONS: CPT | Mod: PO | Performed by: PHYSICAL THERAPIST

## 2020-10-29 PROCEDURE — 97110 THERAPEUTIC EXERCISES: CPT | Mod: PO | Performed by: PHYSICAL THERAPIST

## 2020-10-29 NOTE — PROGRESS NOTES
"  Physical Therapy Daily Treatment Note     Name: Cassandra Francis  Clinic Number: [id number]    Therapy Diagnosis:   Encounter Diagnoses   Name Primary?    Weakness of shoulder Yes    Decreased range of motion of left shoulder      Physician: Haile Erazo MD    Visit Date: 10/29/2020  Physician Orders: PT Eval and Treat   Post Surgical? No Eval and Treat Yes Type of Therapy Outpatient Therapy Location: Shoulder Athlete No Dry Needling No   Medical Diagnosis from Referral: M75.02 (ICD-10-CM) - Adhesive capsulitis of left shoulder   Evaluation Date: 10/27/2020  Authorization Period Expiration: 10/26/2021, 12/31/2020  Plan of Care Expiration: 12/25/2020  Visit # / Visits authorized: 2/20    Time In: 1100  Time Out: 1130  Total Billable Time: 28 minutes    Precautions: Standard    Subjective     Pt reports: her L shoulder hurts this morning 8/10.  She was compliant with home exercise program.  Response to previous treatment: no adverse effect  Functional change: Improving ROM    Pain: 8/10 pre tx, 7/10 post tx  Location: left shoulder      Objective     Cassandra received therapeutic exercises to develop strength and ROM for 22 minutes including:    Over head pulleys 3' flex (subjective taken)  Wand Ex: 2x10  Flex  scaption  ER  Press up  Protraction  PROM stretching x15 each    Seated scap retraction 2x10, needing cues for "shoulders into back pocket"    Cassandra received the following manual therapy techniques: Joint mobilizations were applied to the: L shoulder for 08 minutes, including:  Inf, post glides gd I-II    Home Exercises Provided and Patient Education Provided     Education provided:   - Cont HEP  - Relax and allow PROM  - Scap retraction, avoid shoulder elevation.    Written Home Exercises Provided: Patient instructed to cont prior HEP.  Exercises were reviewed and Cassandra was able to demonstrate them prior to the end of the session.  Cassandra demonstrated good  understanding of the education " provided.     See EMR under Patient Instructions for exercises provided 10/27/2020.    Assessment     Cassandra left with decreased L shoulder pain. She does guard often and move away out of fear and pain. Pt required encouragement and time for relaxation. She stated that she felt comfortable after session. She did req cues to avoid shoulder elevation during scap retraction.    Cassandra is progressing well towards her goals.   Pt prognosis is Good.     Pt will continue to benefit from skilled outpatient physical therapy to address the deficits listed in the problem list box on initial evaluation, provide pt/family education and to maximize pt's level of independence in the home and community environment.     Pt's spiritual, cultural and educational needs considered and pt agreeable to plan of care and goals.    Anticipated barriers to physical therapy: fear avoidance    Goals: progressing to all  Short Term Goals: 4 weeks   -Improve L shoulder AROM flex to 120 deg for improved reaching.  -Improve L shoulder AROM abd to 100 deg for improving reaching  -L shoulder strength grossly 4/5.  -Improve FOTO impairment score to 42% for improved QOL.     Long Term Goals: 8 weeks   -L shoulder AROM within 10 deg of contralateral side.  -L shoulder strength grossly 4+/5, for dressing.  -Improve FOTO impairment score to 35% or better for improved QOL.    Plan     Plan of care Certification: 10/27/2020 to 12/25/2020.     Outpatient Physical Therapy 2 times weekly for 8 weeks to include the following interventions: Electrical Stimulation IFC, Manual Therapy, Moist Heat/ Ice, Patient Education, Self Care, Therapeutic Activites, Therapeutic Exercise and Ultrasound.      Sabrina Escobar, PT    Sabrina Escobar, PT

## 2020-11-02 ENCOUNTER — TELEPHONE (OUTPATIENT)
Dept: FAMILY MEDICINE | Facility: CLINIC | Age: 55
End: 2020-11-02

## 2020-11-02 LAB
HUMAN PAPILLOMAVIRUS (HPV): NORMAL
PAP SMEAR: NORMAL

## 2020-11-02 NOTE — TELEPHONE ENCOUNTER
----- Message from Maday Rose Marie sent at 11/2/2020 12:24 PM CST -----  Regarding: results  Contact: pt  Type: Needs Medical Advice    Who Called:      Best Call Back Number:     Additional Information: Requesting a call back from Nurse, regarding pt needs her results ,please call back with advice

## 2020-11-02 NOTE — TELEPHONE ENCOUNTER
----- Message from Lise Mcqueen sent at 11/2/2020  4:00 PM CST -----  Regarding: advice  Contact: pt  Type:  Test Results    Who Called: pt  Name of Test (Lab/Mammo/Etc):  labs  Date of Test:    Ordering Provider:    Where the test was performed:  [facility]   Best Call Back Number:  [phone]  Additional Information:  call placed to pod.

## 2020-11-03 ENCOUNTER — CLINICAL SUPPORT (OUTPATIENT)
Dept: REHABILITATION | Facility: HOSPITAL | Age: 55
End: 2020-11-03
Attending: FAMILY MEDICINE
Payer: COMMERCIAL

## 2020-11-03 DIAGNOSIS — M25.612 DECREASED RANGE OF MOTION OF LEFT SHOULDER: ICD-10-CM

## 2020-11-03 DIAGNOSIS — R29.898 WEAKNESS OF SHOULDER: Primary | ICD-10-CM

## 2020-11-03 PROCEDURE — 97110 THERAPEUTIC EXERCISES: CPT | Mod: PO | Performed by: PHYSICAL THERAPIST

## 2020-11-03 PROCEDURE — 97140 MANUAL THERAPY 1/> REGIONS: CPT | Mod: PO | Performed by: PHYSICAL THERAPIST

## 2020-11-03 NOTE — PROGRESS NOTES
"  Physical Therapy Daily Treatment Note     Name: Cassandra Francis  Clinic Number: [id number]    Therapy Diagnosis:   Encounter Diagnoses   Name Primary?    Weakness of shoulder Yes    Decreased range of motion of left shoulder      Physician: Haile Erazo MD    Visit Date: 11/3/2020  Physician Orders: PT Eval and Treat   Post Surgical? No Eval and Treat Yes Type of Therapy Outpatient Therapy Location: Shoulder Athlete No Dry Needling No   Medical Diagnosis from Referral: M75.02 (ICD-10-CM) - Adhesive capsulitis of left shoulder   Evaluation Date: 10/27/2020  Authorization Period Expiration: 10/26/2021, 12/31/2020  Plan of Care Expiration: 12/25/2020  Visit # / Visits authorized: 3/20    Time In: 1115  Time Out: 1200  Total Billable Time: 40 minutes    Precautions: Standard    Subjective     Pt reports: "a little better." L shoulder still hurts.  She was compliant with home exercise program.  Response to previous treatment: no adverse effect  Functional change: Improving ROM    Pain: 3/10 pre tx, 5/10 post tx  Location: left shoulder      Objective     Cassandra received therapeutic exercises to develop strength and ROM for 35 minutes including:    Over head pulleys 3' flex (subjective taken), 3' scaption or abd as tolerated  Wand Ex: 2x10  Flex  scaption  ER  Press up  Protraction  PROM stretching x15-30 each    Wall walks flex x10    Seated scap retraction 2x10, needing cues for "shoulders into back pocket"    Cassandra received the following manual therapy techniques: Joint mobilizations were applied to the: L shoulder for 10 minutes, including:  Inf, ant, and post glides gd IV through various ranges  Subscapularis release    Home Exercises Provided and Patient Education Provided     Education provided:   - Cont HEP  - Scap retraction, avoid shoulder elevation.    Written Home Exercises Provided: Patient instructed to cont prior HEP.  Exercises were reviewed and Cassandra was able to demonstrate them prior to " the end of the session.  Cassandra demonstrated good  understanding of the education provided.     See EMR under Patient Instructions for exercises provided 10/27/2020.    Assessment     Cassandra has scapular compensation noted throughout PROM. She continues to guard from pain during PROM L shoulder. Relaxation and gating pain techniques used to help in treatment. L shoulder ROM is slowly improving. Progressed AAROM as tolerated. Progress to AROM once ready.     Cassandra is progressing well towards her goals.   Pt prognosis is Good.     Pt will continue to benefit from skilled outpatient physical therapy to address the deficits listed in the problem list box on initial evaluation, provide pt/family education and to maximize pt's level of independence in the home and community environment.     Pt's spiritual, cultural and educational needs considered and pt agreeable to plan of care and goals.    Anticipated barriers to physical therapy: fear avoidance    Goals: progressing to all  Short Term Goals: 4 weeks   -Improve L shoulder AROM flex to 120 deg for improved reaching.  -Improve L shoulder AROM abd to 100 deg for improving reaching  -L shoulder strength grossly 4/5.  -Improve FOTO impairment score to 42% for improved QOL.     Long Term Goals: 8 weeks   -L shoulder AROM within 10 deg of contralateral side.  -L shoulder strength grossly 4+/5, for dressing.  -Improve FOTO impairment score to 35% or better for improved QOL.    Plan     Plan of care Certification: 10/27/2020 to 12/25/2020.     Outpatient Physical Therapy 2 times weekly for 8 weeks to include the following interventions: Electrical Stimulation IFC, Manual Therapy, Moist Heat/ Ice, Patient Education, Self Care, Therapeutic Activites, Therapeutic Exercise and Ultrasound.     Sabrina Escobar, PT

## 2020-11-05 ENCOUNTER — CLINICAL SUPPORT (OUTPATIENT)
Dept: REHABILITATION | Facility: HOSPITAL | Age: 55
End: 2020-11-05
Attending: FAMILY MEDICINE
Payer: COMMERCIAL

## 2020-11-05 DIAGNOSIS — M25.612 DECREASED RANGE OF MOTION OF LEFT SHOULDER: ICD-10-CM

## 2020-11-05 DIAGNOSIS — R29.898 WEAKNESS OF SHOULDER: Primary | ICD-10-CM

## 2020-11-05 PROCEDURE — 97140 MANUAL THERAPY 1/> REGIONS: CPT | Mod: PO | Performed by: PHYSICAL THERAPIST

## 2020-11-05 PROCEDURE — 97110 THERAPEUTIC EXERCISES: CPT | Mod: PO | Performed by: PHYSICAL THERAPIST

## 2020-11-05 NOTE — PROGRESS NOTES
"  Physical Therapy Daily Treatment Note     Name: Cassandra Francis  Clinic Number: [id number]    Therapy Diagnosis:   Encounter Diagnoses   Name Primary?    Weakness of shoulder Yes    Decreased range of motion of left shoulder      Physician: Haile Erazo MD    Visit Date: 11/5/2020  Physician Orders: PT Eval and Treat   Post Surgical? No Eval and Treat Yes Type of Therapy Outpatient Therapy Location: Shoulder Athlete No Dry Needling No   Medical Diagnosis from Referral: M75.02 (ICD-10-CM) - Adhesive capsulitis of left shoulder   Evaluation Date: 10/27/2020  Authorization Period Expiration: 10/26/2021, 12/31/2020  Plan of Care Expiration: 12/25/2020  Visit # / Visits authorized: 4/20    Time In: 1145  Time Out: 1230  Total Billable Time: 40 minutes    Precautions: Standard    Subjective     Pt reports: "it was fine after leaving the last time for a few days, but was really hurting yesterday."  She was compliant with home exercise program.  Response to previous treatment: no adverse effect  Functional change: Improving ROM    Pain: 5/10 pre tx, 6/10 post tx  Location: left shoulder      Objective     Cassandra received therapeutic exercises to develop strength and ROM for 35 minutes including:    Over head pulleys 3' flex (subjective taken), 3' scaption or abd as tolerated  Wand Ex: 2x10  Flex  scaption  ER  Press up  Protraction  PROM stretching x15-30 each    Wall walks flex x10  Attempted supine pec stretch without anything-not felt, attempted with 1/2 foam-pt unable to tolerate.  Pec stretch at wall 3x20"    Seated scap retraction 2x10, needing cues for "shoulders into back pocket"  Seated moist hot pack to increase flexibility with lower arm elevated for passive inf GHJ glide with 4# wt x8 mins.    Cassandra received the following manual therapy techniques: Joint mobilizations were applied to the: L shoulder for 10 minutes, including:  Inf, ant, and post glides gd IV through various ranges  Subscapularis " release    Home Exercises Provided and Patient Education Provided     Education provided:   - Cont HEP  - Scap retraction, avoid shoulder elevation.    Written Home Exercises Provided: Patient instructed to cont prior HEP.  Exercises were reviewed and Cassandra was able to demonstrate them prior to the end of the session.  Cassandra demonstrated good  understanding of the education provided.     See EMR under Patient Instructions for exercises provided 10/27/2020.    Assessment     Cassandra has scapular compensation noted throughout PROM. She continues to guard from pain during PROM L shoulder. She pulls away during PROM. Relaxation and gating pain techniques used to help in treatment. L shoulder ROM is slowly improving. She continues significantly impaired in a capsular pattern. Passive ex is difficult for her. She does better at times with active ex but even this is compensated. AROM is to about 110-115 deg elevation Progress to AROM once ready.     Cassandra is progressing well towards her goals.   Pt prognosis is Good.     Pt will continue to benefit from skilled outpatient physical therapy to address the deficits listed in the problem list box on initial evaluation, provide pt/family education and to maximize pt's level of independence in the home and community environment.     Pt's spiritual, cultural and educational needs considered and pt agreeable to plan of care and goals.    Anticipated barriers to physical therapy: fear avoidance    Goals: progressing to all  Short Term Goals: 4 weeks   -Improve L shoulder AROM flex to 120 deg for improved reaching.  -Improve L shoulder AROM abd to 100 deg for improving reaching  -L shoulder strength grossly 4/5.  -Improve FOTO impairment score to 42% for improved QOL.     Long Term Goals: 8 weeks   -L shoulder AROM within 10 deg of contralateral side.  -L shoulder strength grossly 4+/5, for dressing.  -Improve FOTO impairment score to 35% or better for improved  QOL.    Plan     Plan of care Certification: 10/27/2020 to 12/25/2020.     Outpatient Physical Therapy 2 times weekly for 8 weeks to include the following interventions: Electrical Stimulation IFC, Manual Therapy, Moist Heat/ Ice, Patient Education, Self Care, Therapeutic Activites, Therapeutic Exercise and Ultrasound.     Sabrina Escobar, PT

## 2020-11-09 ENCOUNTER — CLINICAL SUPPORT (OUTPATIENT)
Dept: REHABILITATION | Facility: HOSPITAL | Age: 55
End: 2020-11-09
Attending: FAMILY MEDICINE
Payer: COMMERCIAL

## 2020-11-09 DIAGNOSIS — M25.612 DECREASED RANGE OF MOTION OF LEFT SHOULDER: ICD-10-CM

## 2020-11-09 DIAGNOSIS — R29.898 WEAKNESS OF SHOULDER: Primary | ICD-10-CM

## 2020-11-09 PROCEDURE — 97140 MANUAL THERAPY 1/> REGIONS: CPT | Mod: PO | Performed by: PHYSICAL THERAPIST

## 2020-11-09 PROCEDURE — 97110 THERAPEUTIC EXERCISES: CPT | Mod: PO | Performed by: PHYSICAL THERAPIST

## 2020-11-09 NOTE — PROGRESS NOTES
"  Physical Therapy Daily Treatment Note     Name: Cassandra Francis  Clinic Number: [id number]    Therapy Diagnosis:   Encounter Diagnoses   Name Primary?    Weakness of shoulder Yes    Decreased range of motion of left shoulder      Physician: Haile Erazo MD    Visit Date: 11/9/2020  Physician Orders: PT Eval and Treat   Post Surgical? No Eval and Treat Yes Type of Therapy Outpatient Therapy Location: Shoulder Athlete No Dry Needling No   Medical Diagnosis from Referral: M75.02 (ICD-10-CM) - Adhesive capsulitis of left shoulder   Evaluation Date: 10/27/2020  Authorization Period Expiration: 10/26/2021, 12/31/2020  Plan of Care Expiration: 12/25/2020  Visit # / Visits authorized: 5/20    Time In: 1205  Time Out: 1245  Total Billable Time: 38 minutes    Precautions: Standard    Subjective     Pt reports: inc pn after last session, slowly improving reaching.  She was compliant with home exercise program.  Response to previous treatment: no adverse effect  Functional change: Improving ROM    Pain: 4/10 pre tx, 5/10 post tx  Location: left shoulder      Objective     FOTO impairment score: 43% improved from 51% previously.    Cassandra received therapeutic exercises to develop strength and ROM for 30 minutes including:    Over head pulleys 3' flex (subjective taken), 3' scaption or abd as tolerated  Wand Ex: 2x10  Flex  scaption  ER  Press up  Protraction  PROM stretching x15-30 each    Wall walks flex and abd x10  Pec stretch at wall 3x20"    Seated scap retraction 2x10, needing cues for "shoulders into back pocket"  Seated moist hot pack to increase flexibility with lower arm elevated for passive inf GHJ glide with 4# wt x0 mins.    Cassandra received the following manual therapy techniques: Joint mobilizations were applied to the: L shoulder for 10 minutes, including:  Inf, ant, and post glides gd IV through various ranges  Subscapularis release  Pec major bending    Home Exercises Provided and Patient Education " Provided     Education provided:   - Cont HEP  - Scap retraction, avoid shoulder elevation.    Written Home Exercises Provided: Patient instructed to cont prior HEP.  Exercises were reviewed and Cassandra was able to demonstrate them prior to the end of the session.  Cassandra demonstrated good  understanding of the education provided.     See EMR under Patient Instructions for exercises provided 10/27/2020.    Assessment     Cassandra has scapular compensation noted throughout PROM. She continues to guard from pain during PROM L shoulder prior to reaching end feel. She pulls away during PROM. Relaxation and gating pain techniques used to help in treatment. L shoulder ROM is slowly improving. She continues significantly impaired in a capsular pattern. Passive ex is difficult for her. She does better at times with active ex but even this is compensated. Discussed pt trying to push through a little pain in HEP to begin to see progress. Progress to AROM once ready.     Cassandra is progressing well towards her goals.   Pt prognosis is Good.     Pt will continue to benefit from skilled outpatient physical therapy to address the deficits listed in the problem list box on initial evaluation, provide pt/family education and to maximize pt's level of independence in the home and community environment.     Pt's spiritual, cultural and educational needs considered and pt agreeable to plan of care and goals.    Anticipated barriers to physical therapy: fear avoidance    Goals: progressing to all  Short Term Goals: 4 weeks   -Improve L shoulder AROM flex to 120 deg for improved reaching.  -Improve L shoulder AROM abd to 100 deg for improving reaching  -L shoulder strength grossly 4/5.  -Improve FOTO impairment score to 42% for improved QOL.     Long Term Goals: 8 weeks   -L shoulder AROM within 10 deg of contralateral side.  -L shoulder strength grossly 4+/5, for dressing.  -Improve FOTO impairment score to 35% or better for  improved QOL.    Plan     Plan of care Certification: 10/27/2020 to 12/25/2020.     Outpatient Physical Therapy 2 times weekly for 8 weeks to include the following interventions: Electrical Stimulation IFC, Manual Therapy, Moist Heat/ Ice, Patient Education, Self Care, Therapeutic Activites, Therapeutic Exercise and Ultrasound.     Sabrina Escobar, PT

## 2020-11-11 ENCOUNTER — CLINICAL SUPPORT (OUTPATIENT)
Dept: REHABILITATION | Facility: HOSPITAL | Age: 55
End: 2020-11-11
Attending: FAMILY MEDICINE
Payer: COMMERCIAL

## 2020-11-11 DIAGNOSIS — R29.898 WEAKNESS OF SHOULDER: Primary | ICD-10-CM

## 2020-11-11 DIAGNOSIS — M25.612 DECREASED RANGE OF MOTION OF LEFT SHOULDER: ICD-10-CM

## 2020-11-11 PROCEDURE — 97110 THERAPEUTIC EXERCISES: CPT | Mod: PO | Performed by: PHYSICAL THERAPIST

## 2020-11-11 PROCEDURE — 97140 MANUAL THERAPY 1/> REGIONS: CPT | Mod: PO | Performed by: PHYSICAL THERAPIST

## 2020-11-11 NOTE — PROGRESS NOTES
"  Physical Therapy Daily Treatment Note     Name: Cassandra Francis  Clinic Number: [id number]    Therapy Diagnosis:   Encounter Diagnoses   Name Primary?    Weakness of shoulder Yes    Decreased range of motion of left shoulder      Physician: Haile Erazo MD    Visit Date: 11/11/2020  Physician Orders: PT Eval and Treat   Post Surgical? No Eval and Treat Yes Type of Therapy Outpatient Therapy Location: Shoulder Athlete No Dry Needling No   Medical Diagnosis from Referral: M75.02 (ICD-10-CM) - Adhesive capsulitis of left shoulder   Evaluation Date: 10/27/2020  Authorization Period Expiration: 10/26/2021, 12/31/2020  Plan of Care Expiration: 12/25/2020  Visit # / Visits authorized: 6/20    Time In: 1203  Time Out: 1245  Total Billable Time: 40 minutes    Precautions: Standard    Subjective     Pt reports: she continues with L shoulder pain ant shoulder and upper arm. Her reaching is getting better.  She was compliant with home exercise program.  Response to previous treatment: no adverse effect  Functional change: Improving ROM    Pain: 4/10 pre tx, 4/10 post tx  Location: left shoulder      Objective     11/13/2020: FOTO impairment score: 43% improved from 51% previously.    Cassandra received therapeutic exercises to develop strength and ROM for 32 minutes including:    Over head pulleys 3' flex (subjective taken), 3' scaption or abd as tolerated  Wand Ex 3#: 2x10  Flex  scaption  ER  Press up  Protraction  PROM stretching x15 each  R passive biceps stretch 2'    Not performed due to time:  Wall walks flex and abd x10  Pec stretch at wall 3x20"  Seated scap retraction 2x10, needing cues for "shoulders into back pocket"    Seated moist hot pack to increase flexibility with lower arm elevated for passive inf GHJ glide with 4# wt x8 mins.    Cassandra received the following manual therapy techniques: Joint mobilizations were applied to the: L shoulder for 10 minutes, including:  Inf, ant, and post glides gd IV " through various ranges  Subscapularis release  Pec major bending  Scapular distraction    Home Exercises Provided and Patient Education Provided     Education provided:   - Cont HEP  - Scap retraction, avoid shoulder elevation.    Written Home Exercises Provided: Patient instructed to cont prior HEP.  Exercises were reviewed and Cassandra was able to demonstrate them prior to the end of the session.  Cassandra demonstrated good  understanding of the education provided.     See EMR under Patient Instructions for exercises provided 10/27/2020.    Assessment     Cassandra has scapular compensation noted throughout PROM. She continues to guard from pain during PROM L shoulder prior to reaching end feel. She pulls away during PROM. Relaxation and gating pain techniques used to help in treatment. L shoulder ROM is slowly improving. She continues significantly impaired in a capsular pattern. Passive ex is difficult for her. She does better at times with active ex but even this is compensated. Discussed pt trying to push through a little pain in HEP to begin to see progress. Pt frequently rubs the R shoulder and moves it to get comfortable, fear avoidance, or pn. Progress to AROM once ready.     Cassandra is progressing well towards her goals.   Pt prognosis is Good.     Pt will continue to benefit from skilled outpatient physical therapy to address the deficits listed in the problem list box on initial evaluation, provide pt/family education and to maximize pt's level of independence in the home and community environment.     Pt's spiritual, cultural and educational needs considered and pt agreeable to plan of care and goals.    Anticipated barriers to physical therapy: fear avoidance    Goals: progressing to all  Short Term Goals: 4 weeks   -Improve L shoulder AROM flex to 120 deg for improved reaching.  -Improve L shoulder AROM abd to 100 deg for improving reaching  -L shoulder strength grossly 4/5.  -Improve FOTO impairment  score to 42% for improved QOL.     Long Term Goals: 8 weeks   -L shoulder AROM within 10 deg of contralateral side.  -L shoulder strength grossly 4+/5, for dressing.  -Improve FOTO impairment score to 35% or better for improved QOL.    Plan     Plan of care Certification: 10/27/2020 to 12/25/2020.     Outpatient Physical Therapy 2 times weekly for 8 weeks to include the following interventions: Electrical Stimulation IFC, Manual Therapy, Moist Heat/ Ice, Patient Education, Self Care, Therapeutic Activites, Therapeutic Exercise and Ultrasound.     Sabrina Escobar, PT

## 2020-11-17 ENCOUNTER — CLINICAL SUPPORT (OUTPATIENT)
Dept: REHABILITATION | Facility: HOSPITAL | Age: 55
End: 2020-11-17
Attending: FAMILY MEDICINE
Payer: COMMERCIAL

## 2020-11-17 DIAGNOSIS — R29.898 WEAKNESS OF SHOULDER: Primary | ICD-10-CM

## 2020-11-17 DIAGNOSIS — M25.612 DECREASED RANGE OF MOTION OF LEFT SHOULDER: ICD-10-CM

## 2020-11-17 PROCEDURE — 97140 MANUAL THERAPY 1/> REGIONS: CPT | Mod: PO | Performed by: PHYSICAL THERAPIST

## 2020-11-17 PROCEDURE — 97110 THERAPEUTIC EXERCISES: CPT | Mod: PO | Performed by: PHYSICAL THERAPIST

## 2020-11-17 NOTE — PROGRESS NOTES
"  Physical Therapy Daily Treatment Note     Name: Cassandra Francis  Clinic Number: [id number]    Therapy Diagnosis:   Encounter Diagnoses   Name Primary?    Weakness of shoulder Yes    Decreased range of motion of left shoulder      Physician: Haile Erazo MD    Visit Date: 11/17/2020  Physician Orders: PT Eval and Treat   Post Surgical? No Eval and Treat Yes Type of Therapy Outpatient Therapy Location: Shoulder Athlete No Dry Needling No   Medical Diagnosis from Referral: M75.02 (ICD-10-CM) - Adhesive capsulitis of left shoulder   Evaluation Date: 10/27/2020  Authorization Period Expiration: 10/26/2021, 12/31/2020  Plan of Care Expiration: 12/25/2020  Visit # / Visits authorized: 7/20    Time In: 1203  Time Out: 1245  Total Billable Time: 38 minutes    Precautions: Standard    Subjective     Pt reports: she continues with L shoulder pain ant shoulder and upper arm. Her reaching is getting better. She reports some bruising in L shoulder crease/axilla area from last session.  She was compliant with home exercise program.  Response to previous treatment: no adverse effect  Functional change: Improving ROM    Pain: 3/10 pre tx, 4/10 post tx  Location: left shoulder      Objective     11/13/2020: FOTO impairment score: 43% improved from 51% previously.    Cassandra received therapeutic exercises to develop strength and ROM for 34 minutes including:    UBE 4' forward  Over head pulleys 3' flex (subjective taken), 3' scaption or abd as tolerated  Wand Ex 3#: 2x10  Flex  scaption  ER  Press up  Protraction  PROM stretching x15 each  R passive biceps stretch 2'  Wall walks flex and abd x10  Pec stretch at wall 3x20"    Not performed due to time:  Seated scap retraction 2x10, needing cues for "shoulders into back pocket"    Seated moist hot pack to increase flexibility with lower arm elevated for passive inf GHJ glide with 4# wt x0 mins.    Cassandra received the following manual therapy techniques: Joint mobilizations " were applied to the: L shoulder for 08 minutes, including:  Inf, ant, and post glides gd IV through various ranges  Subscapularis release-np  Pec major bending-np  Scapular distraction-np  Inf glides GHJ gd III    Home Exercises Provided and Patient Education Provided     Education provided:   - Cont HEP  - Importance of progressing ROM, limitations by her current pain tolerance    Written Home Exercises Provided: Patient instructed to cont prior HEP.  Exercises were reviewed and Cassandra was able to demonstrate them prior to the end of the session.  Cassandra demonstrated good  understanding of the education provided.     See EMR under Patient Instructions for exercises provided 10/27/2020.    Assessment     Cassandra has scapular compensation noted throughout PROM. She continues to guard from pain during PROM L shoulder prior to reaching end feel. She pulls away during PROM possibly before any progression. Relaxation and gating pain techniques used to help in treatment. L shoulder ROM is slowly improving. She continues significantly impaired in a capsular pattern. Passive ex is difficult for her. She does better at times with active ex but even this is compensated. Discussed pt trying to push through a little pain in HEP and clinic to begin to see progress. Pt frequently rubs the R shoulder and moves it to get comfortable, fear avoidance, or pn. Progress to AROM once ready. Assess progress next visit.     Cassandra is progressing well towards her goals.   Pt prognosis is Good.     Pt will continue to benefit from skilled outpatient physical therapy to address the deficits listed in the problem list box on initial evaluation, provide pt/family education and to maximize pt's level of independence in the home and community environment.     Pt's spiritual, cultural and educational needs considered and pt agreeable to plan of care and goals.    Anticipated barriers to physical therapy: fear avoidance    Goals: progressing  to all  Short Term Goals: 4 weeks   -Improve L shoulder AROM flex to 120 deg for improved reaching.  -Improve L shoulder AROM abd to 100 deg for improving reaching  -L shoulder strength grossly 4/5.  -Improve FOTO impairment score to 42% for improved QOL.     Long Term Goals: 8 weeks   -L shoulder AROM within 10 deg of contralateral side.  -L shoulder strength grossly 4+/5, for dressing.  -Improve FOTO impairment score to 35% or better for improved QOL.    Plan     Plan of care Certification: 10/27/2020 to 12/25/2020.     Outpatient Physical Therapy 2 times weekly for 8 weeks to include the following interventions: Electrical Stimulation IFC, Manual Therapy, Moist Heat/ Ice, Patient Education, Self Care, Therapeutic Activites, Therapeutic Exercise and Ultrasound.     Sabrina Escobar, PT

## 2020-11-23 ENCOUNTER — CLINICAL SUPPORT (OUTPATIENT)
Dept: REHABILITATION | Facility: HOSPITAL | Age: 55
End: 2020-11-23
Attending: FAMILY MEDICINE
Payer: COMMERCIAL

## 2020-11-23 DIAGNOSIS — R29.898 WEAKNESS OF SHOULDER: Primary | ICD-10-CM

## 2020-11-23 DIAGNOSIS — M25.612 DECREASED RANGE OF MOTION OF LEFT SHOULDER: ICD-10-CM

## 2020-11-23 PROCEDURE — 97110 THERAPEUTIC EXERCISES: CPT | Mod: PO | Performed by: PHYSICAL THERAPIST

## 2020-11-23 PROCEDURE — 97140 MANUAL THERAPY 1/> REGIONS: CPT | Mod: PO | Performed by: PHYSICAL THERAPIST

## 2020-11-23 NOTE — PROGRESS NOTES
Reassessment/Physical Therapy Daily Treatment Note     Name: Cassandra Francis  Clinic Number: [id number]    Therapy Diagnosis:   Encounter Diagnoses   Name Primary?    Weakness of shoulder Yes    Decreased range of motion of left shoulder      Physician: Haile Erazo MD    Visit Date: 11/23/2020  Physician Orders: PT Eval and Treat   Post Surgical? No Eval and Treat Yes Type of Therapy Outpatient Therapy Location: Shoulder Athlete No Dry Needling No   Medical Diagnosis from Referral: M75.02 (ICD-10-CM) - Adhesive capsulitis of left shoulder   Evaluation Date: 10/27/2020  Authorization Period Expiration: 10/26/2021, 12/31/2020  Plan of Care Expiration: 12/25/2020  Reassessment: 11/23/2020  Visit # / Visits authorized: 8/20    Time In: 1203  Time Out: 1245  Total Billable Time: 40 minutes    Precautions: Standard    Subjective     Pt reports: she continues with L shoulder pain ant shoulder and upper arm. Her reaching is getting better. She experienced increased pain of the shoulder this weekend. No MD f/u scheduled.  She was compliant with home exercise program.  Response to previous treatment: no adverse effect  Functional change: Improving ROM    Pain: 2/10 pre tx, 4/10 post tx  Location: left shoulder      Objective     11/13/2020: FOTO impairment score: 43% improved from 51% previously.      11/24/2020 Reassessment:  Posture: FHP, RS     Active Range of Motion: Measured in degrees (eval, today)     Shoulder Left Right   Flexion 112, 112 153   Abduction 60, 112 180   ER over head Top of shoulder, T4 (lacking elbow high) T4           IR post reach To lat hip, no change L1      Upper Extremity Strength     Shoulder Left Right   Shoulder flexion: 3/5, 4-/5 5/5   Shoulder Abduction: 3-/5, 3/5 5/5   Shoulder ER 4-/5, no change 5/5   Shoulder IR 4-/5, 4/5 5/5      Scapular Control/Dyskinesis:     Normal / Subtle / Obvious  Comments    Left  obvious Dec GHJ and scapular dissociation, contralateral trunk lat flex  "  Right  normal na      Palpation Shoulder:  Pos L teres minor>infraspinatus     Mobility: decreased pattern PROM accessory in abd>ER>IR     CMS Impairment/Limitation/Restriction for FOTO SHOULDER Survey     Therapist reviewed FOTO scores for Cassandra Francis on 10/27/2020.   FOTO documents entered into [facility] - see Media section.     Limitation Score: 51, 43%         Cassandra received therapeutic exercises to develop strength and ROM for 16 minutes including:    UBE 2'2  PROM stretching x15 each  R passive biceps stretch 2'  Reassessment.    Not performed due to time:  Seated scap retraction 2x10, needing cues for "shoulders into back pocket"  Over head pulleys 3' flex (subjective taken), 3' scaption or abd as tolerated  Wand Ex 3#: 2x10  Flex  scaption  ER  Press up  Protraction  Wall walks flex and abd x10  Pec stretch at wall 3x20"  Wall walks flex and abd x10  Pec stretch at wall 3x20"    Seated moist hot pack to increase flexibility with lower arm elevated for passive inf GHJ glide with 5# wt x08 mins.    Cassandra received the following manual therapy techniques: Joint mobilizations were applied to the: L shoulder for 19 minutes, including:  Inf, ant, and post glides gd IV through various ranges  Subscapularis release-np  Pec major bending  Scapular distraction    Home Exercises Provided and Patient Education Provided     Education provided:   - Cont HEP  - Importance of progressing ROM, limitations by her current pain tolerance    Written Home Exercises Provided: Patient instructed to cont prior HEP.  Exercises were reviewed and Cassandra was able to demonstrate them prior to the end of the session.  Cassandra demonstrated good  understanding of the education provided.     See EMR under Patient Instructions for exercises provided 10/27/2020.    Assessment     Cassandra has scapular compensation noted throughout PROM. She continues to guard from pain during PROM L shoulder prior to reaching end feel. She pulls away " during PROM possibly before any progression. Relaxation and gating pain techniques used to help in treatment. L shoulder ROM is slowly improving. See above reassessment. She has improved in L shoulder AROM abd, ER, and IR. Her strength has improved similarly. She has met 1/4 STGs. She continues significantly impaired in a capsular pattern.  Pt frequently rubs the R shoulder and moves it to get comfortable, fear avoidance, or pn. Using relaxation techniques and modalities for pn. Progress to AROM once ready.     Cassandra is progressing well towards her goals.   Pt prognosis is Good.     Pt will continue to benefit from skilled outpatient physical therapy to address the deficits listed in the problem list box on initial evaluation, provide pt/family education and to maximize pt's level of independence in the home and community environment.     Pt's spiritual, cultural and educational needs considered and pt agreeable to plan of care and goals.    Anticipated barriers to physical therapy: fear avoidance    Goals: progressing to all  Short Term Goals: 4 weeks   -Improve L shoulder AROM flex to 120 deg for improved reaching.  Not met, progressing see above.  -Improve L shoulder AROM abd to 100 deg for improving reaching.  MET 11/23/2020.  -L shoulder strength grossly 4/5.  Not met, progressing see above.  -Improve FOTO impairment score to 42% for improved QOL.  NOT MET PROGRESSING 11/13/2020.     Long Term Goals: 8 weeks- SLOWLY PROGRESSING  -L shoulder AROM within 10 deg of contralateral side.  -L shoulder strength grossly 4+/5, for dressing.  -Improve FOTO impairment score to 35% or better for improved QOL.    Plan     Plan of care Certification: 10/27/2020 to 12/25/2020.     Outpatient Physical Therapy 2 times weekly for 8 weeks to include the following interventions: Electrical Stimulation IFC, Manual Therapy, Moist Heat/ Ice, Patient Education, Self Care, Therapeutic Activites, Therapeutic Exercise and Ultrasound.      Sabrina Escobar, PT

## 2020-11-30 ENCOUNTER — CLINICAL SUPPORT (OUTPATIENT)
Dept: REHABILITATION | Facility: HOSPITAL | Age: 55
End: 2020-11-30
Attending: FAMILY MEDICINE
Payer: COMMERCIAL

## 2020-11-30 DIAGNOSIS — M25.612 DECREASED RANGE OF MOTION OF LEFT SHOULDER: ICD-10-CM

## 2020-11-30 DIAGNOSIS — R29.898 WEAKNESS OF SHOULDER: Primary | ICD-10-CM

## 2020-11-30 PROCEDURE — 97110 THERAPEUTIC EXERCISES: CPT | Mod: PO | Performed by: PHYSICAL THERAPIST

## 2020-11-30 PROCEDURE — 97140 MANUAL THERAPY 1/> REGIONS: CPT | Mod: PO | Performed by: PHYSICAL THERAPIST

## 2020-11-30 NOTE — PROGRESS NOTES
"  Physical Therapy Daily Treatment Note     Name: Cassandra Francis  Clinic Number: [id number]    Therapy Diagnosis:   Encounter Diagnoses   Name Primary?    Weakness of shoulder Yes    Decreased range of motion of left shoulder      Physician: Haile Erazo MD    Visit Date: 11/30/2020  Physician Orders: PT Eval and Treat   Post Surgical? No Eval and Treat Yes Type of Therapy Outpatient Therapy Location: Shoulder Athlete No Dry Needling No   Medical Diagnosis from Referral: M75.02 (ICD-10-CM) - Adhesive capsulitis of left shoulder   Evaluation Date: 10/27/2020  Authorization Period Expiration: 10/26/2021, 12/31/2020  Plan of Care Expiration: 12/25/2020  Reassessment: 11/23/2020  Visit # / Visits authorized: 9/20    Time In: 1203  Time Out: 1245  Total Billable Time: 40 minutes    Precautions: Standard    Subjective     Pt reports: she continues with L shoulder pain ant shoulder and upper arm. Her reaching is getting better.   She was compliant with home exercise program.  Response to previous treatment: no adverse effect  Functional change: Improving ROM    Pain: 2/10 pre tx, 0/10 post tx  Location: left shoulder      Objective     11/13/2020: FOTO impairment score: 43% improved from 51% previously.    Cassandra received therapeutic exercises to develop strength and ROM for 25 minutes including:    UBE 2'2  PROM stretching x15 each  Chest stretch 3x15" 3 positions  Wall walks flex and abd x10    Not performed due to time:  Seated scap retraction 2x10, needing cues for "shoulders into back pocket"  Over head pulleys 3' flex (subjective taken), 3' scaption or abd as tolerated  Wand Ex 3#: 2x10  Flex  scaption  ER  Press up  Protraction  Wall walks flex and abd x10  Pec stretch at wall 3x20"  Pec stretch at wall 3x20"    Seated moist hot pack to increase flexibility with lower arm elevated for passive inf GHJ glide with 5# wt x00 mins.    Cassandra received the following manual therapy techniques: Joint " mobilizations were applied to the: L shoulder for 20 minutes, including:  Inf, ant, and post glides gd IV through various ranges  Subscapularis release  Pec major bending  Scapular distraction  STM to rhomboid adhesions  GHJ distraction with oscillations for relaxation    Home Exercises Provided and Patient Education Provided     Education provided:   - Cont HEP  - Importance of progressing ROM, limitations by her current pain tolerance    Written Home Exercises Provided: Patient instructed to cont prior HEP.  Exercises were reviewed and Cassandra was able to demonstrate them prior to the end of the session.  Cassandra demonstrated good  understanding of the education provided.     See EMR under Patient Instructions for exercises provided 10/27/2020.    Assessment     Cassandra has scapular compensation noted throughout PROM. Muscle guarding was improved today. Relaxation and gating pain techniques used to help in treatment. L shoulder ROM is slowly improving. 2 muscular adhesions are noted at the superior and inferior scapular angles. She continues significantly impaired in a capsular pattern.   Progress to AROM once ready.     Cassandra is progressing well towards her goals.   Pt prognosis is Good.     Pt will continue to benefit from skilled outpatient physical therapy to address the deficits listed in the problem list box on initial evaluation, provide pt/family education and to maximize pt's level of independence in the home and community environment.     Pt's spiritual, cultural and educational needs considered and pt agreeable to plan of care and goals.    Anticipated barriers to physical therapy: fear avoidance    Goals: progressing to all  Short Term Goals: 4 weeks   -Improve L shoulder AROM flex to 120 deg for improved reaching.  Not met, progressing see above.  -Improve L shoulder AROM abd to 100 deg for improving reaching.  MET 11/23/2020.  -L shoulder strength grossly 4/5.  Not met, progressing see  above.  -Improve FOTO impairment score to 42% for improved QOL.  NOT MET PROGRESSING 11/13/2020.     Long Term Goals: 8 weeks- SLOWLY PROGRESSING  -L shoulder AROM within 10 deg of contralateral side.  -L shoulder strength grossly 4+/5, for dressing.  -Improve FOTO impairment score to 35% or better for improved QOL.    Plan     Plan of care Certification: 10/27/2020 to 12/25/2020.     Outpatient Physical Therapy 2 times weekly for 8 weeks to include the following interventions: Electrical Stimulation IFC, Manual Therapy, Moist Heat/ Ice, Patient Education, Self Care, Therapeutic Activites, Therapeutic Exercise and Ultrasound.     Sabrina Escobar, PT

## 2020-12-02 ENCOUNTER — CLINICAL SUPPORT (OUTPATIENT)
Dept: REHABILITATION | Facility: HOSPITAL | Age: 55
End: 2020-12-02
Payer: COMMERCIAL

## 2020-12-02 DIAGNOSIS — M25.612 DECREASED RANGE OF MOTION OF LEFT SHOULDER: ICD-10-CM

## 2020-12-02 DIAGNOSIS — R29.898 WEAKNESS OF SHOULDER: Primary | ICD-10-CM

## 2020-12-02 PROCEDURE — 97110 THERAPEUTIC EXERCISES: CPT | Mod: PO | Performed by: PHYSICAL THERAPIST

## 2020-12-02 PROCEDURE — 97140 MANUAL THERAPY 1/> REGIONS: CPT | Mod: PO | Performed by: PHYSICAL THERAPIST

## 2020-12-02 NOTE — PROGRESS NOTES
"  Physical Therapy Daily Treatment Note     Name: Cassandra Francis  Clinic Number: [id number]    Therapy Diagnosis:   Encounter Diagnoses   Name Primary?    Weakness of shoulder Yes    Decreased range of motion of left shoulder      Physician: Haile Erazo MD    Visit Date: 12/2/2020  Physician Orders: PT Eval and Treat   Post Surgical? No Eval and Treat Yes Type of Therapy Outpatient Therapy Location: Shoulder Athlete No Dry Needling No   Medical Diagnosis from Referral: M75.02 (ICD-10-CM) - Adhesive capsulitis of left shoulder   Evaluation Date: 10/27/2020  Authorization Period Expiration: 10/26/2021, 12/31/2020  Plan of Care Expiration: 12/25/2020  Reassessment: 11/23/2020  Visit # / Visits authorized: 10/20    Time In: 1203  Time Out: 1245  Total Billable Time: 40 minutes    Precautions: Standard    Subjective     Pt reports: she is feeling much better today, but had some pain yesterday.  She was compliant with home exercise program.  Response to previous treatment: no adverse effect  Functional change: Improving ROM    Pain: 0/10 pre tx, 0/10 post tx  Location: left shoulder      Objective     11/13/2020: FOTO impairment score: 43% improved from 51% previously.    Cassandra received therapeutic exercises to develop strength and ROM for 20 minutes including:    UBE 3'3'  PROM stretching x15 each  Chest stretch 3x15" 3 positions  Wall walks flex and abd x10  Ball on wall 4 way x 20 each    Not performed due to time:  Seated scap retraction 2x10, needing cues for "shoulders into back pocket"  Over head pulleys 3' flex (subjective taken), 3' scaption or abd as tolerated  Wand Ex 3#: 2x10  Flex  scaption  ER  Press up  Protraction    Seated moist hot pack to increase flexibility with lower arm elevated for passive inf GHJ glide with 5# wt x00 mins.    Cassandra received the following manual therapy techniques: Joint mobilizations were applied to the: L shoulder for 25 minutes, including:  Inf, ant, and post " alona gd IV through various ranges  Subscapularis release  Pec major bending  Scapular distraction  STM to rhomboid adhesions  GHJ distraction with oscillations for relaxation    Home Exercises Provided and Patient Education Provided     Education provided:   - Cont HEP  - Importance of progressing ROM, limitations by her current pain tolerance    Written Home Exercises Provided: Patient instructed to cont prior HEP.  Exercises were reviewed and Cassandra was able to demonstrate them prior to the end of the session.  Cassandra demonstrated good  understanding of the education provided.     See EMR under Patient Instructions for exercises provided 10/27/2020.    Assessment     Cassandra has scapular compensation noted throughout PROM. Muscle guarding was improved today. Relaxation and gating pain techniques used to help in treatment. L shoulder ROM is slowly improving. 2 muscular adhesions are noted at the superior and inferior scapular angles. She continues significantly impaired in a capsular pattern. Progress to AROM once ready.     Cassandra is progressing well towards her goals.   Pt prognosis is Good.     Pt will continue to benefit from skilled outpatient physical therapy to address the deficits listed in the problem list box on initial evaluation, provide pt/family education and to maximize pt's level of independence in the home and community environment.     Pt's spiritual, cultural and educational needs considered and pt agreeable to plan of care and goals.    Anticipated barriers to physical therapy: fear avoidance    Goals: progressing to all  Short Term Goals: 4 weeks   -Improve L shoulder AROM flex to 120 deg for improved reaching.  Not met, progressing see above.  -Improve L shoulder AROM abd to 100 deg for improving reaching.  MET 11/23/2020.  -L shoulder strength grossly 4/5.  Not met, progressing see above.  -Improve FOTO impairment score to 42% for improved QOL.  NOT MET PROGRESSING  11/13/2020.     Long Term Goals: 8 weeks- SLOWLY PROGRESSING  -L shoulder AROM within 10 deg of contralateral side.  -L shoulder strength grossly 4+/5, for dressing.  -Improve FOTO impairment score to 35% or better for improved QOL.    Plan     Plan of care Certification: 10/27/2020 to 12/25/2020.     Outpatient Physical Therapy 2 times weekly for 8 weeks to include the following interventions: Electrical Stimulation IFC, Manual Therapy, Moist Heat/ Ice, Patient Education, Self Care, Therapeutic Activites, Therapeutic Exercise and Ultrasound.     Sabrina Escobar, PT

## 2020-12-09 ENCOUNTER — CLINICAL SUPPORT (OUTPATIENT)
Dept: REHABILITATION | Facility: HOSPITAL | Age: 55
End: 2020-12-09
Payer: COMMERCIAL

## 2020-12-09 DIAGNOSIS — R29.898 WEAKNESS OF SHOULDER: Primary | ICD-10-CM

## 2020-12-09 DIAGNOSIS — M25.612 DECREASED RANGE OF MOTION OF LEFT SHOULDER: ICD-10-CM

## 2020-12-09 PROCEDURE — 97140 MANUAL THERAPY 1/> REGIONS: CPT | Mod: PO | Performed by: PHYSICAL THERAPIST

## 2020-12-09 NOTE — PROGRESS NOTES
"  Physical Therapy Daily Treatment Note     Name: Cassandra Francis  Clinic Number: [id number]    Therapy Diagnosis:   Encounter Diagnoses   Name Primary?    Weakness of shoulder Yes    Decreased range of motion of left shoulder      Physician: Haile Erazo MD    Visit Date: 12/9/2020  Physician Orders: PT Eval and Treat   Post Surgical? No Eval and Treat Yes Type of Therapy Outpatient Therapy Location: Shoulder Athlete No Dry Needling No   Medical Diagnosis from Referral: M75.02 (ICD-10-CM) - Adhesive capsulitis of left shoulder   Evaluation Date: 10/27/2020  Authorization Period Expiration: 10/26/2021, 12/31/2020  Plan of Care Expiration: 12/25/2020  Reassessment: 11/23/2020  Visit # / Visits authorized: 11/20    Time In: 1330  Time Out: 1415  Total Billable Time: 40 minutes    Precautions: Standard    Subjective     Pt reports: she is feeling much better today. Reaching higher.  She was compliant with home exercise program.  Response to previous treatment: no adverse effect  Functional change: Improving ROM    Pain: 0/10 pre tx, 4/10 post tx  Location: left shoulder      Objective     11/13/2020: FOTO impairment score: 43% improved from 51% previously.    Cassandra received therapeutic exercises to develop strength and ROM for 06 minutes including:    UBE 3'3'  Attempted sleeper stretch 3 mins    Not performed due to time:  PROM stretching x15 each  Chest stretch 3x15" 3 positions  Wall walks flex and abd x10  Ball on wall 4 way x 20 each  Seated scap retraction 2x10, needing cues for "shoulders into back pocket"  Over head pulleys 3' flex (subjective taken), 3' scaption or abd as tolerated  Wand Ex 3#: 2x10  Flex  scaption  ER  Press up  Protraction    Seated moist hot pack to increase flexibility with lower arm elevated for passive inf GHJ glide with 5# wt x00 mins.    Cassandra received the following manual therapy techniques: Joint mobilizations were applied to the: L shoulder for 38 minutes, " including:  Inf, ant, and post glides gd IV through various ranges  Subscapularis release  Pec major bending  Scapular distraction  STM to rhomboid adhesions  STM to L UT, levator scapulae  GHJ distraction with oscillations for relaxation  Supraspinatus and infraspinatus sustained pressure to ttps L    Home Exercises Provided and Patient Education Provided     Education provided:   - Cont HEP  - Importance of progressing ROM, limitations by her current pain tolerance    Written Home Exercises Provided: Patient instructed to cont prior HEP.  Exercises were reviewed and Cassandra was able to demonstrate them prior to the end of the session.  Cassandra demonstrated good  understanding of the education provided.     See EMR under Patient Instructions for exercises provided 10/27/2020.    Assessment     Cassandra has scapular compensation noted throughout PROM. Muscle guarding was improved today. Relaxation and gating pain techniques used to help in treatment. L shoulder ROM is slowly improving. She has significant neck and shoulder muscular tightness inhibiting movement and causing pain. She moves away often during manual therapy and away from attempts at reaching new range.140 deg L shoulder PROM flex was achieved after manual therapy, but was very much compensated by increased lx lordosis. Progress to AROM once ready.     Cassandra is progressing well towards her goals.   Pt prognosis is Good.     Pt will continue to benefit from skilled outpatient physical therapy to address the deficits listed in the problem list box on initial evaluation, provide pt/family education and to maximize pt's level of independence in the home and community environment.     Pt's spiritual, cultural and educational needs considered and pt agreeable to plan of care and goals.    Anticipated barriers to physical therapy: fear avoidance    Goals: progressing to all  Short Term Goals: 4 weeks   -Improve L shoulder AROM flex to 120 deg for improved  reaching.  Not met, progressing see above.  -Improve L shoulder AROM abd to 100 deg for improving reaching.  MET 11/23/2020.  -L shoulder strength grossly 4/5.  Not met, progressing see above.  -Improve FOTO impairment score to 42% for improved QOL.  NOT MET PROGRESSING 11/13/2020.     Long Term Goals: 8 weeks- SLOWLY PROGRESSING  -L shoulder AROM within 10 deg of contralateral side.  -L shoulder strength grossly 4+/5, for dressing.  -Improve FOTO impairment score to 35% or better for improved QOL.    Plan     Plan of care Certification: 10/27/2020 to 12/25/2020.     Outpatient Physical Therapy 2 times weekly for 8 weeks to include the following interventions: Electrical Stimulation IFC, Manual Therapy, Moist Heat/ Ice, Patient Education, Self Care, Therapeutic Activites, Therapeutic Exercise and Ultrasound.     Sabrina Escobar, PT

## 2020-12-14 ENCOUNTER — CLINICAL SUPPORT (OUTPATIENT)
Dept: REHABILITATION | Facility: HOSPITAL | Age: 55
End: 2020-12-14
Payer: COMMERCIAL

## 2020-12-14 DIAGNOSIS — R29.898 WEAKNESS OF SHOULDER: Primary | ICD-10-CM

## 2020-12-14 DIAGNOSIS — M25.612 DECREASED RANGE OF MOTION OF LEFT SHOULDER: ICD-10-CM

## 2020-12-14 PROCEDURE — 97110 THERAPEUTIC EXERCISES: CPT | Mod: PO | Performed by: PHYSICAL THERAPIST

## 2020-12-14 NOTE — PROGRESS NOTES
"  Physical Therapy Daily Treatment Note     Name: Cassandra Francis  Clinic Number: [id number]    Therapy Diagnosis:   Encounter Diagnoses   Name Primary?    Weakness of shoulder Yes    Decreased range of motion of left shoulder      Physician: Haile Erazo MD    Visit Date: 12/14/2020  Physician Orders: PT Eval and Treat   Post Surgical? No Eval and Treat Yes Type of Therapy Outpatient Therapy Location: Shoulder Athlete No Dry Needling No   Medical Diagnosis from Referral: M75.02 (ICD-10-CM) - Adhesive capsulitis of left shoulder   Evaluation Date: 10/27/2020  Authorization Period Expiration: 10/26/2021, 12/31/2020  Plan of Care Expiration: 12/25/2020  Reassessment: 11/23/2020  Visit # / Visits authorized: 12/20    Time In: 1330  Time Out: 1415  Total Billable Time: 40 minutes    Precautions: Standard    Subjective     Pt reports: she had "burning" pain in L shoulder after last tx. She is concerned about this pain that she had, but is no longer experiencing. She indicates that she did not like and may not want this type of pain. She requested MHP in the beginning of tx.  She was compliant with home exercise program.  Response to previous treatment: no adverse effect  Functional change: Improving ROM    Pain: 3/10 pre tx, 3/10 post tx  Location: left shoulder      Objective     FOTO impairment score:  51% at eval, 43% 11/13, 40% 12/14.    Cassandra received therapeutic exercises to develop strength and ROM for 45 minutes including:    UBE 3'3' (subjective taken)  Chest stretch 3x15" 3 positions  Wall walks flex and abd x10  Ball on wall 4 way x 20 each  Rows and shld ext green band 2x10 each  Pt deferred manual therapy this session.  Seated moist hot pack to increase flexibility with lower arm elevated for passive inf GHJ glide with 5# wt x08 mins.    Not performed due to time:  PROM stretching x15 each  Seated scap retraction 2x10, needing cues for "shoulders into back pocket"  Over head pulleys 3' flex " (subjective taken), 3' scaption or abd as tolerated  Wand Ex 3#: 2x10  Flex  scaption  ER  Press up  Protraction    Cassandra received the following manual therapy techniques: Joint mobilizations were applied to the: L shoulder for 00 minutes, including:  Inf, ant, and post glides gd IV through various ranges  Subscapularis release  Pec major bending  Scapular distraction  STM to rhomboid adhesions  STM to L UT, levator scapulae  GHJ distraction with oscillations for relaxation  Supraspinatus and infraspinatus sustained pressure to ttps L    Home Exercises Provided and Patient Education Provided     Education provided:   - Cont HEP  - Importance of progressing ROM, limitations by her current pain tolerance    Written Home Exercises Provided: Patient instructed to cont prior HEP.  Exercises were reviewed and Cassandra was able to demonstrate them prior to the end of the session.  Cassandra demonstrated good  understanding of the education provided.     See EMR under Patient Instructions for exercises provided 10/27/2020.    Assessment     Cassandra has scapular compensation noted throughout PROM. She requested to defer manual therapy as suspect due to pain tolerance. L shoulder ROM is slowly improving. She has significant neck and shoulder muscular tightness inhibiting movement and causing pain. PT explained the PT process of healing a frozen shoulder. Pt is limited by pain tolerance. She requested moist hot pack to begin session. Pt needs continued manual therapy and active ex. Progress to AROM once ready. FOTO score slightly improved since last assessment.    Cassandra is progressing well towards her goals.   Pt prognosis is Good.     Pt will continue to benefit from skilled outpatient physical therapy to address the deficits listed in the problem list box on initial evaluation, provide pt/family education and to maximize pt's level of independence in the home and community environment.     Pt's spiritual, cultural and  educational needs considered and pt agreeable to plan of care and goals.    Anticipated barriers to physical therapy: fear avoidance    Goals: progressing to all  Short Term Goals: 4 weeks   -Improve L shoulder AROM flex to 120 deg for improved reaching.  Not met, progressing see above.  -Improve L shoulder AROM abd to 100 deg for improving reaching.  MET 11/23/2020.  -L shoulder strength grossly 4/5.  Not met, progressing see above.  -Improve FOTO impairment score to 42% for improved QOL.  NOT MET PROGRESSING 11/13/2020.     Long Term Goals: 8 weeks- SLOWLY PROGRESSING  -L shoulder AROM within 10 deg of contralateral side.  -L shoulder strength grossly 4+/5, for dressing.  -Improve FOTO impairment score to 35% or better for improved QOL.    Plan     Plan of care Certification: 10/27/2020 to 12/25/2020.     Outpatient Physical Therapy 2 times weekly for 8 weeks to include the following interventions: Electrical Stimulation IFC, Manual Therapy, Moist Heat/ Ice, Patient Education, Self Care, Therapeutic Activites, Therapeutic Exercise and Ultrasound.     Sabrina Escobar, PT

## 2020-12-16 ENCOUNTER — CLINICAL SUPPORT (OUTPATIENT)
Dept: REHABILITATION | Facility: HOSPITAL | Age: 55
End: 2020-12-16
Payer: COMMERCIAL

## 2020-12-16 DIAGNOSIS — M25.612 DECREASED RANGE OF MOTION OF LEFT SHOULDER: ICD-10-CM

## 2020-12-16 DIAGNOSIS — R29.898 WEAKNESS OF SHOULDER: Primary | ICD-10-CM

## 2020-12-16 PROCEDURE — 97110 THERAPEUTIC EXERCISES: CPT | Mod: PO | Performed by: PHYSICAL THERAPIST

## 2020-12-16 NOTE — PROGRESS NOTES
"  Reassessment/Physical Therapy Daily Treatment Note     Name: Cassandra Francis  Clinic Number: [id number]    Therapy Diagnosis:   Encounter Diagnoses   Name Primary?    Weakness of shoulder Yes    Decreased range of motion of left shoulder      Physician: Haile Erazo MD    Visit Date: 12/16/2020  Physician Orders: PT Eval and Treat   Post Surgical? No Eval and Treat Yes Type of Therapy Outpatient Therapy Location: Shoulder Athlete No Dry Needling No   Medical Diagnosis from Referral: M75.02 (ICD-10-CM) - Adhesive capsulitis of left shoulder   Evaluation Date: 10/27/2020  Authorization Period Expiration: 10/26/2021, 12/31/2020  Plan of Care Expiration: 12/25/2020, 2/12/2020  Reassessment: 11/23/2020, 12/16/2020  Visit # / Visits authorized: 3/20, 10/30    Time In: 1330  Time Out: 1415  Total Billable Time: 40 minutes    Precautions: Standard    Subjective     Pt reports: 2/10 L shoulder pain. She is better at reaching to groom.   She was compliant with home exercise program.  Response to previous treatment: no adverse effect  Functional change: Improving ROM    Pain: 2/10 pre tx, 3/10 post tx  Location: left shoulder      Objective     FOTO impairment score:  51% at eval, 43% 11/13, 40% 12/14.    Cassandra received therapeutic exercises to develop strength and ROM for 45 minutes including:    See reassessment.    UBE 3'3' (subjective taken)  Chest stretch 3x15" 3 positions  Wall walks flex and abd x10  Ball on wall 4 way x 20 each  Rows and shld ext green band 3x10 each  Cabinet reach x15 flex and scaption each second shelf  Wall slides for serratus x15  Seated moist hot pack to increase flexibility with lower arm elevated for passive inf GHJ glide with 5# wt x00 mins.    Not performed due to time:  PROM stretching x15 each  Seated scap retraction 2x10, needing cues for "shoulders into back pocket"  Over head pulleys 3' flex (subjective taken), 3' scaption or abd as tolerated  Wand Ex 3#: " 2x10  Flex  scaption  ER  Press up  Protraction    Cassandra received the following manual therapy techniques: Joint mobilizations were applied to the: L shoulder for 00 minutes, including:  Inf, ant, and post glides gd IV through various ranges  Subscapularis release  Pec major bending  Scapular distraction  STM to rhomboid adhesions  STM to L UT, levator scapulae  GHJ distraction with oscillations for relaxation  Supraspinatus and infraspinatus sustained pressure to ttps L    Home Exercises Provided and Patient Education Provided     Education provided:   - Cont HEP  - Importance of progressing ROM, limitations by her current pain tolerance    Written Home Exercises Provided: Patient instructed to cont prior HEP.  Exercises were reviewed and Cassandra was able to demonstrate them prior to the end of the session.  Cassandra demonstrated good  understanding of the education provided.     See EMR under Patient Instructions for exercises provided 10/27/2020.    Assessment     Cassandra has scapular compensation noted throughout PROM. She has significant neck and shoulder muscular tightness inhibiting movement and causing pain. Scapular dyskesis continues. Pt is limited by pain tolerance. She requested moist hot pack to begin session. Pt needs continued manual therapy and active ex, but has deferred manual therapy at this time. She has improved some in L shoulder AROM and strength-just very slowly, but this is compensated still. Progress to AROM once ready. FOTO score slightly improved since last assessment.    Cassandra is progressing well towards her goals.   Pt prognosis is Good.     Pt will continue to benefit from skilled outpatient physical therapy to address the deficits listed in the problem list box on initial evaluation, provide pt/family education and to maximize pt's level of independence in the home and community environment.     Pt's spiritual, cultural and educational needs considered and pt agreeable to plan  of care and goals.    Anticipated barriers to physical therapy: fear avoidance    Goals: progressing to all  Short Term Goals: 4 weeks   -Improve L shoulder AROM flex to 120 deg for improved reaching.  Not met, progressing see above.  MET 12/16/2020, 131 deg.  -Improve L shoulder AROM abd to 100 deg for improving reaching.  MET 11/23/2020.  -L shoulder strength grossly 4/5.  Not met, progressing see above.  Shoulder:  Flex: 4-/5  Ext: 4-/5  Abd: 3+/5, limited by GHJ hypomobility in reaching testing position.  IR: 4/5  ER: 4-/5  -Improve FOTO impairment score to 42% for improved QOL.  NOT MET PROGRESSING 11/13/2020.     Long Term Goals: 8 weeks- SLOWLY PROGRESSING  -L shoulder AROM within 10 deg of contralateral side.  -L shoulder strength grossly 4+/5, for dressing.  -Improve FOTO impairment score to 35% or better for improved QOL.    Plan     Plan of care Certification: 10/27/2020 to 12/25/2020.  Updated Plan of care Certification: 12/16/2020 to 2/12/2020.    Outpatient Physical Therapy 2 times weekly for 8 weeks to include the following interventions: Electrical Stimulation IFC, Manual Therapy, Moist Heat/ Ice, Patient Education, Self Care, Therapeutic Activites, Therapeutic Exercise and Ultrasound.     Sabrina Escobar, PT

## 2020-12-16 NOTE — PLAN OF CARE
"  Outpatient Therapy Updated Plan of Care     Visit Date: 12/16/2020  Name: Cassandra Francis  Clinic Number: [id number]    Therapy Diagnosis:   Encounter Diagnoses   Name Primary?    Weakness of shoulder Yes    Decreased range of motion of left shoulder      Physician: Haile Erazo MD    Physician Orders: PT Eval and Treat   Post Surgical? No Eval and Treat Yes Type of Therapy Outpatient Therapy Location: Shoulder Athlete No Dry Needling No   Medical Diagnosis from Referral: M75.02 (ICD-10-CM) - Adhesive capsulitis of left shoulder   Evaluation Date: 10/27/2020    Total Visits Received: 13  Cancelled Visits: 0  No Show Visits: 0    Current Certification Period:  10/27/2020 to 12/25/2020  Precautions:  Standard  Visits from Evaluation Date:  12  Functional Level Prior to Evaluation:  pain with reaching and compensating    Subjective     Update:   Pt reports: 2/10 L shoulder pain. She is better at reaching to groom.   She was compliant with home exercise program.  Response to previous treatment: no adverse effect  Functional change: Improving ROM     Pain: 2/10 pre tx, 3/10 post tx  Location: left shoulder    Objective     Update:   FOTO impairment score:  51% at eval, 43% 11/13, 40% 12/14.     Cassandra received therapeutic exercises to develop strength and ROM for 45 minutes including:     See reassessment.     UBE 3'3' (subjective taken)  Chest stretch 3x15" 3 positions  Wall walks flex and abd x10  Ball on wall 4 way x 20 each  Rows and shld ext green band 3x10 each  Cabinet reach x15 flex and scaption each second shelf  Wall slides for serratus x15  Seated moist hot pack to increase flexibility with lower arm elevated for passive inf GHJ glide with 5# wt x00 mins.     Not performed due to time:  PROM stretching x15 each  Seated scap retraction 2x10, needing cues for "shoulders into back pocket"  Over head pulleys 3' flex (subjective taken), 3' scaption or abd as tolerated  Wand Ex 3#: " 2x10  Flex  scaption  ER  Press up  Protraction     Cassandra received the following manual therapy techniques: Joint mobilizations were applied to the: L shoulder for 00 minutes, including:  Inf, ant, and post glides gd IV through various ranges  Subscapularis release  Pec major bending  Scapular distraction  STM to rhomboid adhesions  STM to L UT, levator scapulae  GHJ distraction with oscillations for relaxation  Supraspinatus and infraspinatus sustained pressure to ttps L     Home Exercises Provided and Patient Education Provided      Education provided:   - Cont HEP  - Importance of progressing ROM, limitations by her current pain tolerance     Written Home Exercises Provided: Patient instructed to cont prior HEP.  Exercises were reviewed and Cassandra was able to demonstrate them prior to the end of the session.  Cassandra demonstrated good  understanding of the education provided.      See EMR under Patient Instructions for exercises provided 10/27/2020.    Assessment     Update:   Cassandra has scapular compensation noted throughout PROM. She has significant neck and shoulder muscular tightness inhibiting movement and causing pain. Scapular dyskesis continues. Pt is limited by pain tolerance. She requested moist hot pack to begin session. Pt needs continued manual therapy and active ex, but has deferred manual therapy at this time. She has improved some in L shoulder AROM and strength-just very slowly, but this is compensated still. Progress to AROM once ready. FOTO score slightly improved since last assessment.     Cassandra is progressing well towards her goals.   Pt prognosis is Good.      Pt will continue to benefit from skilled outpatient physical therapy to address the deficits listed in the problem list box on initial evaluation, provide pt/family education and to maximize pt's level of independence in the home and community environment.      Pt's spiritual, cultural and educational needs considered and pt  agreeable to plan of care and goals.     Anticipated barriers to physical therapy: fear avoidance    Previous Short Term Goals Status:     Short Term Goals: 4 weeks   -Improve L shoulder AROM flex to 120 deg for improved reaching.  Not met, progressing see above.  MET 12/16/2020, 131 deg.  -Improve L shoulder AROM abd to 100 deg for improving reaching.  MET 11/23/2020.  -L shoulder strength grossly 4/5.  Not met, progressing see above.  Shoulder:  Flex: 4-/5  Ext: 4-/5  Abd: 3+/5, limited by GHJ hypomobility in reaching testing position.  IR: 4/5  ER: 4-/5  -Improve FOTO impairment score to 42% for improved QOL.  NOT MET PROGRESSING 11/13/2020.     New Short Term Goals Status:   Continue previous goals  Long Term Goal Status:   continue per initial plan of care.  Long Term Goals: 8 weeks- SLOWLY PROGRESSING  -L shoulder AROM within 10 deg of contralateral side.  -L shoulder strength grossly 4+/5, for dressing.  -Improve FOTO impairment score to 35% or better for improved QOL.  Reasons for Recertification of Therapy:   Pt continues significantly impaired in L shld ROM and strength impacting fx.     Plan     Updated Certification Period: 12/16/2020 to 2/12/2020.  Recommended Treatment Plan: 2 times per week for 8 weeks: Aquatic Therapy, Electrical Stimulation IFC, Manual Therapy, Moist Heat/ Ice, Neuromuscular Re-ed, Patient Education, Self Care, Therapeutic Activites, Therapeutic Exercise and Ultrasound    Sabrina Escobar, PT  12/16/2020      I CERTIFY THE NEED FOR THESE SERVICES FURNISHED UNDER THIS PLAN OF TREATMENT AND WHILE UNDER MY CARE    Physician's comments:        Physician's Signature: ___________________________________________________

## 2020-12-28 ENCOUNTER — IMMUNIZATION (OUTPATIENT)
Dept: PHARMACY | Facility: CLINIC | Age: 55
End: 2020-12-28
Payer: COMMERCIAL

## 2020-12-28 ENCOUNTER — CLINICAL SUPPORT (OUTPATIENT)
Dept: REHABILITATION | Facility: HOSPITAL | Age: 55
End: 2020-12-28
Payer: COMMERCIAL

## 2020-12-28 DIAGNOSIS — R29.898 WEAKNESS OF SHOULDER: Primary | ICD-10-CM

## 2020-12-28 DIAGNOSIS — M25.612 DECREASED RANGE OF MOTION OF LEFT SHOULDER: ICD-10-CM

## 2020-12-28 PROCEDURE — 97110 THERAPEUTIC EXERCISES: CPT | Mod: PO | Performed by: PHYSICAL THERAPIST

## 2020-12-28 NOTE — PROGRESS NOTES
"  Reassessment/Physical Therapy Daily Treatment Note     Name: Cassandra Francis  Clinic Number: [id number]    Therapy Diagnosis:   Encounter Diagnoses   Name Primary?    Weakness of shoulder Yes    Decreased range of motion of left shoulder      Physician: Haile Erazo MD    Visit Date: 12/28/2020  Physician Orders: PT Eval and Treat   Post Surgical? No Eval and Treat Yes Type of Therapy Outpatient Therapy Location: Shoulder Athlete No Dry Needling No   Medical Diagnosis from Referral: M75.02 (ICD-10-CM) - Adhesive capsulitis of left shoulder   Evaluation Date: 10/27/2020  Authorization Period Expiration: 10/26/2021, 12/31/2020  Plan of Care Expiration: 12/25/2020, 2/12/2020  Reassessment: 11/23/2020, 12/16/2020, 12/28/2020  Visit # / Visits authorized: 3/20, 11/30    Time In: 1200  Time Out: 1250  Total Billable Time: 42 minutes    Precautions: Standard    Subjective     Pt reports: 2/10 L shoulder pain. She is better at reaching to groom. She is asking to cont 1x/week with therapy.  She was compliant with home exercise program.  Response to previous treatment: no adverse effect  Functional change: Improving ROM    Pain: 2/10 pre tx, 3/10 post tx  Location: left shoulder      Objective     FOTO impairment score:  51% at eval, 43% 11/13, 40% 12/14.    Cassandra received therapeutic exercises to develop strength and ROM for 45 minutes including:    See reassessment below.  Shoulder AROM:  Flex: 135 deg  Abd: 157 deg  ER: T4  IR: post iliac crest    Shoulder strength:  Flex: 4/5  Ext: 4+/5  Abd: 4-/5  ER: 4/5  IR: 4-/5    UBE 3'3' (subjective taken)  Chest stretch 3x15" 3 positions  IR stretch with strap 3x15"  Wall walks flex and abd x10  Ball on wall 4 way x 20 each-np  Rows and shld ext green band 3x10 each  Cabinet reach x15 flex, scaption, cross body each second shelf (cross body Next visit)  Wall slides for serratus x15-np  Updated HEP given with green band.    Home Exercises Provided and Patient Education " Provided     Education provided:   - Cont HEP  - Importance of progressing ROM, limitations by her current pain tolerance  - ok to dec to 1x/week    Written Home Exercises Provided: yes.  Exercises were reviewed and Cassandra was able to demonstrate them prior to the end of the session.  Cassandra demonstrated good  understanding of the education provided.     See EMR under Patient Instructions for exercises provided 12/28/2020.    Assessment     Cassandra has scapular compensation noted throughout PROM. She has significant neck and shoulder muscular tightness inhibiting movement and causing pain. Scapular dyskesis continues. Pt is limited by pain tolerance. Pt needs continued manual therapy and active ex, but has deferred manual therapy at this time. She has improved some in L shoulder AROM and strength-just very slowly, but this is compensated still. She requested to cont 1x/week therapy which I believe is acceptable given the Covid-19 pandemic and her HEP compliance.    Cassandra is progressing well towards her goals.   Pt prognosis is Good.     Pt will continue to benefit from skilled outpatient physical therapy to address the deficits listed in the problem list box on initial evaluation, provide pt/family education and to maximize pt's level of independence in the home and community environment.     Pt's spiritual, cultural and educational needs considered and pt agreeable to plan of care and goals.    Anticipated barriers to physical therapy: fear avoidance    Goals: progressing to all  Short Term Goals: 4 weeks   -Improve L shoulder AROM flex to 120 deg for improved reaching.  Not met, progressing see above.  MET 12/16/2020, 131 deg.  -Improve L shoulder AROM abd to 100 deg for improving reaching.  MET 11/23/2020.  -L shoulder strength grossly 4/5.  Not met, progressing see above.  Shoulder:  Flex: 4-/5  Ext: 4-/5  Abd: 3+/5, limited by GHJ hypomobility in reaching testing position.  IR: 4/5  ER:  4-/5  Shoulder:  Flex: 4/5  Ext: 4+/5  Abd: 4-/5  IR: 4/5  ER: 4/5  NOT MET PROGRESSING 12/28/2020.  -Improve FOTO impairment score to 42% for improved QOL.  NOT MET PROGRESSING 11/13/2020.    Long Term Goals: 8 weeks- SLOWLY PROGRESSING  -L shoulder AROM within 10 deg of contralateral side.  -L shoulder strength grossly 4+/5, for dressing.  -Improve FOTO impairment score to 35% or better for improved QOL.    Plan     Plan of care Certification: 10/27/2020 to 12/25/2020.  Updated Plan of care Certification: 12/16/2020 to 2/12/2020.    Outpatient Physical Therapy 2 times weekly for 8 weeks to include the following interventions: Electrical Stimulation IFC, Manual Therapy, Moist Heat/ Ice, Patient Education, Self Care, Therapeutic Activites, Therapeutic Exercise and Ultrasound.     Sabrina Escobar, PT

## 2021-01-11 ENCOUNTER — CLINICAL SUPPORT (OUTPATIENT)
Dept: REHABILITATION | Facility: HOSPITAL | Age: 56
End: 2021-01-11
Payer: COMMERCIAL

## 2021-01-11 DIAGNOSIS — M25.612 DECREASED RANGE OF MOTION OF LEFT SHOULDER: ICD-10-CM

## 2021-01-11 DIAGNOSIS — R29.898 WEAKNESS OF SHOULDER: Primary | ICD-10-CM

## 2021-01-11 PROCEDURE — 97110 THERAPEUTIC EXERCISES: CPT | Mod: PO | Performed by: PHYSICAL THERAPIST

## 2021-01-25 ENCOUNTER — CLINICAL SUPPORT (OUTPATIENT)
Dept: REHABILITATION | Facility: HOSPITAL | Age: 56
End: 2021-01-25
Payer: COMMERCIAL

## 2021-01-25 DIAGNOSIS — R29.898 WEAKNESS OF SHOULDER: Primary | ICD-10-CM

## 2021-01-25 DIAGNOSIS — M25.612 DECREASED RANGE OF MOTION OF LEFT SHOULDER: ICD-10-CM

## 2021-01-25 PROCEDURE — 97110 THERAPEUTIC EXERCISES: CPT | Mod: PO | Performed by: PHYSICAL THERAPIST

## 2021-01-28 ENCOUNTER — TELEPHONE (OUTPATIENT)
Dept: GASTROENTEROLOGY | Facility: CLINIC | Age: 56
End: 2021-01-28

## 2021-02-01 ENCOUNTER — CLINICAL SUPPORT (OUTPATIENT)
Dept: REHABILITATION | Facility: HOSPITAL | Age: 56
End: 2021-02-01
Payer: COMMERCIAL

## 2021-02-01 DIAGNOSIS — R29.898 WEAKNESS OF SHOULDER: Primary | ICD-10-CM

## 2021-02-01 DIAGNOSIS — M25.612 DECREASED RANGE OF MOTION OF LEFT SHOULDER: ICD-10-CM

## 2021-02-01 PROCEDURE — 97110 THERAPEUTIC EXERCISES: CPT | Mod: PO | Performed by: PHYSICAL THERAPIST

## 2021-02-08 ENCOUNTER — CLINICAL SUPPORT (OUTPATIENT)
Dept: REHABILITATION | Facility: HOSPITAL | Age: 56
End: 2021-02-08
Payer: COMMERCIAL

## 2021-02-08 DIAGNOSIS — M25.612 DECREASED RANGE OF MOTION OF LEFT SHOULDER: ICD-10-CM

## 2021-02-08 DIAGNOSIS — R29.898 WEAKNESS OF SHOULDER: Primary | ICD-10-CM

## 2021-02-08 PROCEDURE — 97110 THERAPEUTIC EXERCISES: CPT | Mod: PO | Performed by: PHYSICAL THERAPIST

## 2021-03-10 ENCOUNTER — IMMUNIZATION (OUTPATIENT)
Dept: FAMILY MEDICINE | Facility: CLINIC | Age: 56
End: 2021-03-10
Payer: COMMERCIAL

## 2021-03-10 DIAGNOSIS — Z23 NEED FOR VACCINATION: Primary | ICD-10-CM

## 2021-03-10 PROCEDURE — 91300 COVID-19, MRNA, LNP-S, PF, 30 MCG/0.3 ML DOSE VACCINE: CPT | Mod: PBBFAC | Performed by: FAMILY MEDICINE

## 2021-03-19 ENCOUNTER — PATIENT OUTREACH (OUTPATIENT)
Dept: ADMINISTRATIVE | Facility: HOSPITAL | Age: 56
End: 2021-03-19

## 2021-03-28 ENCOUNTER — PATIENT MESSAGE (OUTPATIENT)
Dept: FAMILY MEDICINE | Facility: CLINIC | Age: 56
End: 2021-03-28

## 2021-03-29 ENCOUNTER — IMMUNIZATION (OUTPATIENT)
Dept: FAMILY MEDICINE | Facility: CLINIC | Age: 56
End: 2021-03-29
Payer: COMMERCIAL

## 2021-03-29 DIAGNOSIS — Z23 NEED FOR VACCINATION: Primary | ICD-10-CM

## 2021-03-29 PROCEDURE — 0002A COVID-19, MRNA, LNP-S, PF, 30 MCG/0.3 ML DOSE VACCINE: CPT | Mod: CV19,,, | Performed by: FAMILY MEDICINE

## 2021-03-29 PROCEDURE — 91300 COVID-19, MRNA, LNP-S, PF, 30 MCG/0.3 ML DOSE VACCINE: ICD-10-PCS | Mod: ,,, | Performed by: FAMILY MEDICINE

## 2021-03-29 PROCEDURE — 91300 COVID-19, MRNA, LNP-S, PF, 30 MCG/0.3 ML DOSE VACCINE: CPT | Mod: ,,, | Performed by: FAMILY MEDICINE

## 2021-03-29 PROCEDURE — 0002A COVID-19, MRNA, LNP-S, PF, 30 MCG/0.3 ML DOSE VACCINE: ICD-10-PCS | Mod: CV19,,, | Performed by: FAMILY MEDICINE

## 2021-09-10 ENCOUNTER — OFFICE VISIT (OUTPATIENT)
Dept: URGENT CARE | Facility: CLINIC | Age: 56
End: 2021-09-10
Payer: COMMERCIAL

## 2021-09-10 VITALS
SYSTOLIC BLOOD PRESSURE: 167 MMHG | RESPIRATION RATE: 16 BRPM | WEIGHT: 171 LBS | OXYGEN SATURATION: 98 % | TEMPERATURE: 98 F | BODY MASS INDEX: 32.28 KG/M2 | HEIGHT: 61 IN | DIASTOLIC BLOOD PRESSURE: 94 MMHG | HEART RATE: 87 BPM

## 2021-09-10 DIAGNOSIS — R30.0 DYSURIA: ICD-10-CM

## 2021-09-10 DIAGNOSIS — N39.0 URINARY TRACT INFECTION WITH HEMATURIA, SITE UNSPECIFIED: Primary | ICD-10-CM

## 2021-09-10 DIAGNOSIS — R31.9 URINARY TRACT INFECTION WITH HEMATURIA, SITE UNSPECIFIED: Primary | ICD-10-CM

## 2021-09-10 LAB
BILIRUB UR QL STRIP: NEGATIVE
GLUCOSE UR QL STRIP: NEGATIVE
KETONES UR QL STRIP: NEGATIVE
LEUKOCYTE ESTERASE UR QL STRIP: POSITIVE
PH, POC UA: 6 (ref 5–8)
POC BLOOD, URINE: POSITIVE
POC NITRATES, URINE: NEGATIVE
PROT UR QL STRIP: NEGATIVE
SP GR UR STRIP: 1 (ref 1–1.03)
UROBILINOGEN UR STRIP-ACNC: NORMAL (ref 0.1–1.1)

## 2021-09-10 PROCEDURE — 81003 URINALYSIS AUTO W/O SCOPE: CPT | Mod: QW,S$GLB,, | Performed by: NURSE PRACTITIONER

## 2021-09-10 PROCEDURE — 1159F PR MEDICATION LIST DOCUMENTED IN MEDICAL RECORD: ICD-10-PCS | Mod: CPTII,S$GLB,, | Performed by: NURSE PRACTITIONER

## 2021-09-10 PROCEDURE — 3080F PR MOST RECENT DIASTOLIC BLOOD PRESSURE >= 90 MM HG: ICD-10-PCS | Mod: CPTII,S$GLB,, | Performed by: NURSE PRACTITIONER

## 2021-09-10 PROCEDURE — 3008F PR BODY MASS INDEX (BMI) DOCUMENTED: ICD-10-PCS | Mod: CPTII,S$GLB,, | Performed by: NURSE PRACTITIONER

## 2021-09-10 PROCEDURE — 81003 POCT URINALYSIS, DIPSTICK, AUTOMATED, W/O SCOPE: ICD-10-PCS | Mod: QW,S$GLB,, | Performed by: NURSE PRACTITIONER

## 2021-09-10 PROCEDURE — 3077F PR MOST RECENT SYSTOLIC BLOOD PRESSURE >= 140 MM HG: ICD-10-PCS | Mod: CPTII,S$GLB,, | Performed by: NURSE PRACTITIONER

## 2021-09-10 PROCEDURE — 99213 PR OFFICE/OUTPT VISIT, EST, LEVL III, 20-29 MIN: ICD-10-PCS | Mod: 25,S$GLB,, | Performed by: NURSE PRACTITIONER

## 2021-09-10 PROCEDURE — 99213 OFFICE O/P EST LOW 20 MIN: CPT | Mod: 25,S$GLB,, | Performed by: NURSE PRACTITIONER

## 2021-09-10 PROCEDURE — 1159F MED LIST DOCD IN RCRD: CPT | Mod: CPTII,S$GLB,, | Performed by: NURSE PRACTITIONER

## 2021-09-10 PROCEDURE — 3080F DIAST BP >= 90 MM HG: CPT | Mod: CPTII,S$GLB,, | Performed by: NURSE PRACTITIONER

## 2021-09-10 PROCEDURE — 3077F SYST BP >= 140 MM HG: CPT | Mod: CPTII,S$GLB,, | Performed by: NURSE PRACTITIONER

## 2021-09-10 PROCEDURE — 3008F BODY MASS INDEX DOCD: CPT | Mod: CPTII,S$GLB,, | Performed by: NURSE PRACTITIONER

## 2021-09-10 RX ORDER — PHENAZOPYRIDINE HYDROCHLORIDE 200 MG/1
200 TABLET, FILM COATED ORAL
Qty: 9 TABLET | Refills: 0 | Status: SHIPPED | OUTPATIENT
Start: 2021-09-10 | End: 2021-09-13

## 2021-09-10 RX ORDER — NITROFURANTOIN 25; 75 MG/1; MG/1
100 CAPSULE ORAL 2 TIMES DAILY
Qty: 14 CAPSULE | Refills: 0 | Status: SHIPPED | OUTPATIENT
Start: 2021-09-10 | End: 2021-09-17

## 2021-10-27 ENCOUNTER — LAB VISIT (OUTPATIENT)
Dept: LAB | Facility: HOSPITAL | Age: 56
End: 2021-10-27
Attending: FAMILY MEDICINE
Payer: COMMERCIAL

## 2021-10-27 ENCOUNTER — OFFICE VISIT (OUTPATIENT)
Dept: FAMILY MEDICINE | Facility: CLINIC | Age: 56
End: 2021-10-27
Payer: COMMERCIAL

## 2021-10-27 ENCOUNTER — IMMUNIZATION (OUTPATIENT)
Dept: FAMILY MEDICINE | Facility: CLINIC | Age: 56
End: 2021-10-27
Payer: COMMERCIAL

## 2021-10-27 VITALS
SYSTOLIC BLOOD PRESSURE: 138 MMHG | HEIGHT: 61 IN | BODY MASS INDEX: 31.88 KG/M2 | WEIGHT: 168.88 LBS | DIASTOLIC BLOOD PRESSURE: 70 MMHG | TEMPERATURE: 99 F | HEART RATE: 88 BPM | RESPIRATION RATE: 18 BRPM | OXYGEN SATURATION: 100 %

## 2021-10-27 DIAGNOSIS — Z00.00 PREVENTATIVE HEALTH CARE: Primary | ICD-10-CM

## 2021-10-27 DIAGNOSIS — Z23 NEED FOR VACCINATION: Primary | ICD-10-CM

## 2021-10-27 DIAGNOSIS — Z00.00 PREVENTATIVE HEALTH CARE: ICD-10-CM

## 2021-10-27 LAB
ALBUMIN SERPL BCP-MCNC: 3.7 G/DL (ref 3.5–5.2)
ALP SERPL-CCNC: 94 U/L (ref 55–135)
ALT SERPL W/O P-5'-P-CCNC: 10 U/L (ref 10–44)
ANION GAP SERPL CALC-SCNC: 11 MMOL/L (ref 8–16)
AST SERPL-CCNC: 14 U/L (ref 10–40)
BASOPHILS # BLD AUTO: 0.04 K/UL (ref 0–0.2)
BASOPHILS NFR BLD: 0.6 % (ref 0–1.9)
BILIRUB SERPL-MCNC: 0.6 MG/DL (ref 0.1–1)
BUN SERPL-MCNC: 9 MG/DL (ref 6–20)
CALCIUM SERPL-MCNC: 10.1 MG/DL (ref 8.7–10.5)
CHLORIDE SERPL-SCNC: 103 MMOL/L (ref 95–110)
CHOLEST SERPL-MCNC: 217 MG/DL (ref 120–199)
CHOLEST/HDLC SERPL: 2.8 {RATIO} (ref 2–5)
CO2 SERPL-SCNC: 27 MMOL/L (ref 23–29)
CREAT SERPL-MCNC: 0.7 MG/DL (ref 0.5–1.4)
DIFFERENTIAL METHOD: ABNORMAL
EOSINOPHIL # BLD AUTO: 0.1 K/UL (ref 0–0.5)
EOSINOPHIL NFR BLD: 0.9 % (ref 0–8)
ERYTHROCYTE [DISTWIDTH] IN BLOOD BY AUTOMATED COUNT: 13.8 % (ref 11.5–14.5)
EST. GFR  (AFRICAN AMERICAN): >60 ML/MIN/1.73 M^2
EST. GFR  (NON AFRICAN AMERICAN): >60 ML/MIN/1.73 M^2
ESTIMATED AVG GLUCOSE: 103 MG/DL (ref 68–131)
GLUCOSE SERPL-MCNC: 96 MG/DL (ref 70–110)
HBA1C MFR BLD: 5.2 % (ref 4–5.6)
HCT VFR BLD AUTO: 36.8 % (ref 37–48.5)
HDLC SERPL-MCNC: 78 MG/DL (ref 40–75)
HDLC SERPL: 35.9 % (ref 20–50)
HGB BLD-MCNC: 12 G/DL (ref 12–16)
IMM GRANULOCYTES # BLD AUTO: 0.02 K/UL (ref 0–0.04)
IMM GRANULOCYTES NFR BLD AUTO: 0.3 % (ref 0–0.5)
LDLC SERPL CALC-MCNC: 119 MG/DL (ref 63–159)
LYMPHOCYTES # BLD AUTO: 2 K/UL (ref 1–4.8)
LYMPHOCYTES NFR BLD: 28.7 % (ref 18–48)
MCH RBC QN AUTO: 30.3 PG (ref 27–31)
MCHC RBC AUTO-ENTMCNC: 32.6 G/DL (ref 32–36)
MCV RBC AUTO: 93 FL (ref 82–98)
MONOCYTES # BLD AUTO: 0.4 K/UL (ref 0.3–1)
MONOCYTES NFR BLD: 6.1 % (ref 4–15)
NEUTROPHILS # BLD AUTO: 4.4 K/UL (ref 1.8–7.7)
NEUTROPHILS NFR BLD: 63.4 % (ref 38–73)
NONHDLC SERPL-MCNC: 139 MG/DL
NRBC BLD-RTO: 0 /100 WBC
PLATELET # BLD AUTO: 321 K/UL (ref 150–450)
PMV BLD AUTO: 10.8 FL (ref 9.2–12.9)
POTASSIUM SERPL-SCNC: 3.8 MMOL/L (ref 3.5–5.1)
PROT SERPL-MCNC: 7.2 G/DL (ref 6–8.4)
RBC # BLD AUTO: 3.96 M/UL (ref 4–5.4)
SODIUM SERPL-SCNC: 141 MMOL/L (ref 136–145)
TRIGL SERPL-MCNC: 100 MG/DL (ref 30–150)
TSH SERPL DL<=0.005 MIU/L-ACNC: 1.67 UIU/ML (ref 0.4–4)
WBC # BLD AUTO: 6.94 K/UL (ref 3.9–12.7)

## 2021-10-27 PROCEDURE — 36415 COLL VENOUS BLD VENIPUNCTURE: CPT | Mod: PO | Performed by: FAMILY MEDICINE

## 2021-10-27 PROCEDURE — 3075F SYST BP GE 130 - 139MM HG: CPT | Mod: CPTII,S$GLB,, | Performed by: FAMILY MEDICINE

## 2021-10-27 PROCEDURE — 99999 PR PBB SHADOW E&M-EST. PATIENT-LVL III: CPT | Mod: PBBFAC,,, | Performed by: FAMILY MEDICINE

## 2021-10-27 PROCEDURE — 1159F PR MEDICATION LIST DOCUMENTED IN MEDICAL RECORD: ICD-10-PCS | Mod: CPTII,S$GLB,, | Performed by: FAMILY MEDICINE

## 2021-10-27 PROCEDURE — 3008F PR BODY MASS INDEX (BMI) DOCUMENTED: ICD-10-PCS | Mod: CPTII,S$GLB,, | Performed by: FAMILY MEDICINE

## 2021-10-27 PROCEDURE — 99999 PR PBB SHADOW E&M-EST. PATIENT-LVL III: ICD-10-PCS | Mod: PBBFAC,,, | Performed by: FAMILY MEDICINE

## 2021-10-27 PROCEDURE — 99396 PR PREVENTIVE VISIT,EST,40-64: ICD-10-PCS | Mod: S$GLB,,, | Performed by: FAMILY MEDICINE

## 2021-10-27 PROCEDURE — 80061 LIPID PANEL: CPT | Performed by: FAMILY MEDICINE

## 2021-10-27 PROCEDURE — 84443 ASSAY THYROID STIM HORMONE: CPT | Performed by: FAMILY MEDICINE

## 2021-10-27 PROCEDURE — 91300 COVID-19, MRNA, LNP-S, PF, 30 MCG/0.3 ML DOSE VACCINE: CPT | Mod: PBBFAC | Performed by: FAMILY MEDICINE

## 2021-10-27 PROCEDURE — 85025 COMPLETE CBC W/AUTO DIFF WBC: CPT | Performed by: FAMILY MEDICINE

## 2021-10-27 PROCEDURE — 1159F MED LIST DOCD IN RCRD: CPT | Mod: CPTII,S$GLB,, | Performed by: FAMILY MEDICINE

## 2021-10-27 PROCEDURE — 3075F PR MOST RECENT SYSTOLIC BLOOD PRESS GE 130-139MM HG: ICD-10-PCS | Mod: CPTII,S$GLB,, | Performed by: FAMILY MEDICINE

## 2021-10-27 PROCEDURE — 3078F DIAST BP <80 MM HG: CPT | Mod: CPTII,S$GLB,, | Performed by: FAMILY MEDICINE

## 2021-10-27 PROCEDURE — 3008F BODY MASS INDEX DOCD: CPT | Mod: CPTII,S$GLB,, | Performed by: FAMILY MEDICINE

## 2021-10-27 PROCEDURE — 99396 PREV VISIT EST AGE 40-64: CPT | Mod: S$GLB,,, | Performed by: FAMILY MEDICINE

## 2021-10-27 PROCEDURE — 3078F PR MOST RECENT DIASTOLIC BLOOD PRESSURE < 80 MM HG: ICD-10-PCS | Mod: CPTII,S$GLB,, | Performed by: FAMILY MEDICINE

## 2021-10-27 PROCEDURE — 83036 HEMOGLOBIN GLYCOSYLATED A1C: CPT | Performed by: FAMILY MEDICINE

## 2021-10-27 PROCEDURE — 0003A COVID-19, MRNA, LNP-S, PF, 30 MCG/0.3 ML DOSE VACCINE: CPT | Mod: PBBFAC | Performed by: FAMILY MEDICINE

## 2021-10-27 PROCEDURE — 80053 COMPREHEN METABOLIC PANEL: CPT | Performed by: FAMILY MEDICINE

## 2021-11-23 ENCOUNTER — OFFICE VISIT (OUTPATIENT)
Dept: OBSTETRICS AND GYNECOLOGY | Facility: CLINIC | Age: 56
End: 2021-11-23
Payer: COMMERCIAL

## 2021-11-23 VITALS
BODY MASS INDEX: 32.72 KG/M2 | SYSTOLIC BLOOD PRESSURE: 130 MMHG | WEIGHT: 173.31 LBS | DIASTOLIC BLOOD PRESSURE: 86 MMHG | HEIGHT: 61 IN

## 2021-11-23 DIAGNOSIS — Z01.419 ENCOUNTER FOR GYNECOLOGICAL EXAMINATION WITHOUT ABNORMAL FINDING: Primary | ICD-10-CM

## 2021-11-23 PROCEDURE — 87624 HPV HI-RISK TYP POOLED RSLT: CPT | Performed by: OBSTETRICS & GYNECOLOGY

## 2021-11-23 PROCEDURE — 99396 PR PREVENTIVE VISIT,EST,40-64: ICD-10-PCS | Mod: S$GLB,,, | Performed by: OBSTETRICS & GYNECOLOGY

## 2021-11-23 PROCEDURE — 99999 PR PBB SHADOW E&M-EST. PATIENT-LVL III: ICD-10-PCS | Mod: PBBFAC,,, | Performed by: OBSTETRICS & GYNECOLOGY

## 2021-11-23 PROCEDURE — 99999 PR PBB SHADOW E&M-EST. PATIENT-LVL III: CPT | Mod: PBBFAC,,, | Performed by: OBSTETRICS & GYNECOLOGY

## 2021-11-23 PROCEDURE — 99396 PREV VISIT EST AGE 40-64: CPT | Mod: S$GLB,,, | Performed by: OBSTETRICS & GYNECOLOGY

## 2021-11-30 LAB
CYTOLOGIST CVX/VAG CYTO: NORMAL
CYTOLOGY CVX/VAG DOC CYTO: NORMAL
CYTOLOGY CVX/VAG DOC THIN PREP: NORMAL
CYTOLOGY THINPREP PAP COMMENT: NORMAL
HPV HR 12 DNA CVX QL NAA+PROBE: NEGATIVE
HPV16 DNA CVX QL NAA+PROBE: NEGATIVE
HPV18 DNA CVX QL NAA+PROBE: NEGATIVE
PAP NOTE: NORMAL
STAT OF ADQ CVX/VAG CYTO-IMP: NORMAL

## 2022-10-27 ENCOUNTER — OFFICE VISIT (OUTPATIENT)
Dept: FAMILY MEDICINE | Facility: CLINIC | Age: 57
End: 2022-10-27
Payer: COMMERCIAL

## 2022-10-27 VITALS
HEIGHT: 61 IN | TEMPERATURE: 98 F | DIASTOLIC BLOOD PRESSURE: 70 MMHG | RESPIRATION RATE: 18 BRPM | BODY MASS INDEX: 33.34 KG/M2 | HEART RATE: 93 BPM | OXYGEN SATURATION: 99 % | WEIGHT: 176.56 LBS | SYSTOLIC BLOOD PRESSURE: 130 MMHG

## 2022-10-27 DIAGNOSIS — Z00.00 PREVENTATIVE HEALTH CARE: Primary | ICD-10-CM

## 2022-10-27 PROCEDURE — 3078F DIAST BP <80 MM HG: CPT | Mod: CPTII,S$GLB,, | Performed by: FAMILY MEDICINE

## 2022-10-27 PROCEDURE — 99999 PR PBB SHADOW E&M-EST. PATIENT-LVL III: ICD-10-PCS | Mod: PBBFAC,,, | Performed by: FAMILY MEDICINE

## 2022-10-27 PROCEDURE — 99999 PR PBB SHADOW E&M-EST. PATIENT-LVL III: CPT | Mod: PBBFAC,,, | Performed by: FAMILY MEDICINE

## 2022-10-27 PROCEDURE — 1160F RVW MEDS BY RX/DR IN RCRD: CPT | Mod: CPTII,S$GLB,, | Performed by: FAMILY MEDICINE

## 2022-10-27 PROCEDURE — 3075F PR MOST RECENT SYSTOLIC BLOOD PRESS GE 130-139MM HG: ICD-10-PCS | Mod: CPTII,S$GLB,, | Performed by: FAMILY MEDICINE

## 2022-10-27 PROCEDURE — 3044F PR MOST RECENT HEMOGLOBIN A1C LEVEL <7.0%: ICD-10-PCS | Mod: CPTII,S$GLB,, | Performed by: FAMILY MEDICINE

## 2022-10-27 PROCEDURE — 3044F HG A1C LEVEL LT 7.0%: CPT | Mod: CPTII,S$GLB,, | Performed by: FAMILY MEDICINE

## 2022-10-27 PROCEDURE — 3078F PR MOST RECENT DIASTOLIC BLOOD PRESSURE < 80 MM HG: ICD-10-PCS | Mod: CPTII,S$GLB,, | Performed by: FAMILY MEDICINE

## 2022-10-27 PROCEDURE — 3075F SYST BP GE 130 - 139MM HG: CPT | Mod: CPTII,S$GLB,, | Performed by: FAMILY MEDICINE

## 2022-10-27 PROCEDURE — 99396 PREV VISIT EST AGE 40-64: CPT | Mod: S$GLB,,, | Performed by: FAMILY MEDICINE

## 2022-10-27 PROCEDURE — 1159F MED LIST DOCD IN RCRD: CPT | Mod: CPTII,S$GLB,, | Performed by: FAMILY MEDICINE

## 2022-10-27 PROCEDURE — 1160F PR REVIEW ALL MEDS BY PRESCRIBER/CLIN PHARMACIST DOCUMENTED: ICD-10-PCS | Mod: CPTII,S$GLB,, | Performed by: FAMILY MEDICINE

## 2022-10-27 PROCEDURE — 1159F PR MEDICATION LIST DOCUMENTED IN MEDICAL RECORD: ICD-10-PCS | Mod: CPTII,S$GLB,, | Performed by: FAMILY MEDICINE

## 2022-10-27 PROCEDURE — 99396 PR PREVENTIVE VISIT,EST,40-64: ICD-10-PCS | Mod: S$GLB,,, | Performed by: FAMILY MEDICINE

## 2022-10-27 NOTE — PROGRESS NOTES
Subjective:       Patient ID: Cassandra Francis is a 57 y.o. female.    Chief Complaint: Preventative Health Care (Physical)    Pt here for annual visit.      Pt follows with gynecologist, Dr. Dev Valencia for pap and mammogram.    No past medical history on file.    Exercising some.    Past Surgical History:     SECTION, CLASSIC                                      No Known Allergies    Social History    Marital Status:                       Occupational History  Occupation          Employer            Comment               not employed                                Social History Main Topics    Smoking Status: Never Smoker                      Smokeless Status: Never Used                        Alcohol Use: No              Drug Use: No              Sexual Activity: Not on file          No current outpatient medications on file prior to visit.  No current facility-administered medications on file prior to visit.        No family history on file.      Review of Systems   Constitutional:  Negative for activity change, appetite change, fatigue and unexpected weight change.   HENT:  Negative for congestion, dental problem, ear pain, hearing loss, nosebleeds, postnasal drip, sinus pressure and tinnitus.    Eyes:  Negative for pain, discharge and visual disturbance.   Respiratory:  Negative for cough, choking, chest tightness, shortness of breath and wheezing.    Cardiovascular:  Negative for chest pain, palpitations and leg swelling.   Gastrointestinal:  Negative for abdominal distention, abdominal pain, blood in stool, constipation, diarrhea, nausea and vomiting.             Genitourinary: Negative.    Musculoskeletal:  Positive for arthralgias (left shoulder). Negative for back pain, gait problem, joint swelling and myalgias.   Skin:  Negative for color change, pallor, rash and wound.   Neurological:  Negative for dizziness, tremors, syncope, weakness, light-headedness, numbness and headaches.  "  Hematological:  Negative for adenopathy. Does not bruise/bleed easily.   Psychiatric/Behavioral:  Negative for agitation, confusion, dysphoric mood and sleep disturbance. The patient is not nervous/anxious.          Objective:      /70   Pulse 93   Temp 98.3 °F (36.8 °C) (Oral)   Resp 18   Ht 5' 1" (1.549 m)   Wt 80.1 kg (176 lb 9.4 oz)   LMP 05/14/2012   SpO2 99%   BMI 33.37 kg/m²     Physical Exam  Constitutional:       Appearance: Normal appearance. She is well-developed.   HENT:      Head: Normocephalic.      Mouth/Throat:      Pharynx: No oropharyngeal exudate or posterior oropharyngeal erythema.   Eyes:      Conjunctiva/sclera: Conjunctivae normal.      Pupils: Pupils are equal, round, and reactive to light.   Neck:      Thyroid: No thyromegaly.   Cardiovascular:      Rate and Rhythm: Normal rate and regular rhythm.      Heart sounds: Normal heart sounds, S1 normal and S2 normal. No murmur heard.    No friction rub. No gallop.   Pulmonary:      Effort: Pulmonary effort is normal.      Breath sounds: Normal breath sounds. No wheezing or rales.   Abdominal:      General: Bowel sounds are normal. There is no distension.      Palpations: Abdomen is soft. There is no mass.      Tenderness: There is no abdominal tenderness. There is no guarding or rebound.   Musculoskeletal:      Left shoulder: Tenderness present. No deformity. Decreased range of motion.      Cervical back: Normal range of motion and neck supple.      Right lower leg: No edema.      Left lower leg: No edema.   Lymphadenopathy:      Cervical: No cervical adenopathy.   Skin:     General: Skin is warm.      Findings: No rash.   Neurological:      Mental Status: She is alert and oriented to person, place, and time.      Cranial Nerves: No cranial nerve deficit.      Gait: Gait normal.           Assessment:          Plan:        Cassandra was seen today for preventative health care.    Diagnoses and all orders for this " visit:    Preventative health care  -     Comprehensive Metabolic Panel; Future  -     CBC Auto Differential; Future  -     Lipid Panel; Future  -     TSH; Future  -     Hemoglobin A1C; Future       labs today  Pfizer booster  Counseled on regular exercise, maintenance of a healthy weight, balanced diet rich in fruits/vegetables and lean protein, and avoidance of unhealthy habits like smoking and excessive alcohol intake.

## 2022-10-28 ENCOUNTER — LAB VISIT (OUTPATIENT)
Dept: LAB | Facility: HOSPITAL | Age: 57
End: 2022-10-28
Attending: FAMILY MEDICINE
Payer: COMMERCIAL

## 2022-10-28 DIAGNOSIS — Z00.00 PREVENTATIVE HEALTH CARE: ICD-10-CM

## 2022-10-28 LAB
BASOPHILS # BLD AUTO: 0.05 K/UL (ref 0–0.2)
BASOPHILS NFR BLD: 0.8 % (ref 0–1.9)
DIFFERENTIAL METHOD: NORMAL
EOSINOPHIL # BLD AUTO: 0.1 K/UL (ref 0–0.5)
EOSINOPHIL NFR BLD: 0.9 % (ref 0–8)
ERYTHROCYTE [DISTWIDTH] IN BLOOD BY AUTOMATED COUNT: 12.6 % (ref 11.5–14.5)
ESTIMATED AVG GLUCOSE: 105 MG/DL (ref 68–131)
HBA1C MFR BLD: 5.3 % (ref 4–5.6)
HCT VFR BLD AUTO: 39.9 % (ref 37–48.5)
HGB BLD-MCNC: 13 G/DL (ref 12–16)
IMM GRANULOCYTES # BLD AUTO: 0.01 K/UL (ref 0–0.04)
IMM GRANULOCYTES NFR BLD AUTO: 0.2 % (ref 0–0.5)
LYMPHOCYTES # BLD AUTO: 2.1 K/UL (ref 1–4.8)
LYMPHOCYTES NFR BLD: 31.3 % (ref 18–48)
MCH RBC QN AUTO: 30 PG (ref 27–31)
MCHC RBC AUTO-ENTMCNC: 32.6 G/DL (ref 32–36)
MCV RBC AUTO: 92 FL (ref 82–98)
MONOCYTES # BLD AUTO: 0.3 K/UL (ref 0.3–1)
MONOCYTES NFR BLD: 4.5 % (ref 4–15)
NEUTROPHILS # BLD AUTO: 4.1 K/UL (ref 1.8–7.7)
NEUTROPHILS NFR BLD: 62.3 % (ref 38–73)
NRBC BLD-RTO: 0 /100 WBC
PLATELET # BLD AUTO: 317 K/UL (ref 150–450)
PMV BLD AUTO: 11.3 FL (ref 9.2–12.9)
RBC # BLD AUTO: 4.33 M/UL (ref 4–5.4)
WBC # BLD AUTO: 6.62 K/UL (ref 3.9–12.7)

## 2022-10-28 PROCEDURE — 85025 COMPLETE CBC W/AUTO DIFF WBC: CPT | Performed by: FAMILY MEDICINE

## 2022-10-28 PROCEDURE — 36415 COLL VENOUS BLD VENIPUNCTURE: CPT | Mod: PN | Performed by: FAMILY MEDICINE

## 2022-10-28 PROCEDURE — 80061 LIPID PANEL: CPT | Performed by: FAMILY MEDICINE

## 2022-10-28 PROCEDURE — 80053 COMPREHEN METABOLIC PANEL: CPT | Performed by: FAMILY MEDICINE

## 2022-10-28 PROCEDURE — 83036 HEMOGLOBIN GLYCOSYLATED A1C: CPT | Performed by: FAMILY MEDICINE

## 2022-10-28 PROCEDURE — 84443 ASSAY THYROID STIM HORMONE: CPT | Performed by: FAMILY MEDICINE

## 2022-10-29 LAB
ALBUMIN SERPL BCP-MCNC: 4 G/DL (ref 3.5–5.2)
ALP SERPL-CCNC: 100 U/L (ref 55–135)
ALT SERPL W/O P-5'-P-CCNC: 15 U/L (ref 10–44)
ANION GAP SERPL CALC-SCNC: 16 MMOL/L (ref 8–16)
AST SERPL-CCNC: 15 U/L (ref 10–40)
BILIRUB SERPL-MCNC: 0.4 MG/DL (ref 0.1–1)
BUN SERPL-MCNC: 8 MG/DL (ref 6–20)
CALCIUM SERPL-MCNC: 9.9 MG/DL (ref 8.7–10.5)
CHLORIDE SERPL-SCNC: 105 MMOL/L (ref 95–110)
CHOLEST SERPL-MCNC: 241 MG/DL (ref 120–199)
CHOLEST/HDLC SERPL: 4.1 {RATIO} (ref 2–5)
CO2 SERPL-SCNC: 21 MMOL/L (ref 23–29)
CREAT SERPL-MCNC: 0.7 MG/DL (ref 0.5–1.4)
EST. GFR  (NO RACE VARIABLE): >60 ML/MIN/1.73 M^2
GLUCOSE SERPL-MCNC: 83 MG/DL (ref 70–110)
HDLC SERPL-MCNC: 59 MG/DL (ref 40–75)
HDLC SERPL: 24.5 % (ref 20–50)
LDLC SERPL CALC-MCNC: 163.2 MG/DL (ref 63–159)
NONHDLC SERPL-MCNC: 182 MG/DL
POTASSIUM SERPL-SCNC: 4.1 MMOL/L (ref 3.5–5.1)
PROT SERPL-MCNC: 7.6 G/DL (ref 6–8.4)
SODIUM SERPL-SCNC: 142 MMOL/L (ref 136–145)
TRIGL SERPL-MCNC: 94 MG/DL (ref 30–150)
TSH SERPL DL<=0.005 MIU/L-ACNC: 1.28 UIU/ML (ref 0.4–4)

## 2022-11-09 ENCOUNTER — IMMUNIZATION (OUTPATIENT)
Dept: FAMILY MEDICINE | Facility: CLINIC | Age: 57
End: 2022-11-09
Payer: COMMERCIAL

## 2022-11-09 DIAGNOSIS — Z23 NEED FOR VACCINATION: Primary | ICD-10-CM

## 2022-11-09 PROCEDURE — 0124A COVID-19, MRNA, LNP-S, BIVALENT BOOSTER, PF, 30 MCG/0.3 ML DOSE: CPT | Mod: PBBFAC | Performed by: FAMILY MEDICINE

## 2022-11-09 PROCEDURE — 91312 COVID-19, MRNA, LNP-S, BIVALENT BOOSTER, PF, 30 MCG/0.3 ML DOSE: ICD-10-PCS | Mod: S$GLB,,, | Performed by: FAMILY MEDICINE

## 2022-11-09 PROCEDURE — 91312 COVID-19, MRNA, LNP-S, BIVALENT BOOSTER, PF, 30 MCG/0.3 ML DOSE: CPT | Mod: S$GLB,,, | Performed by: FAMILY MEDICINE

## 2022-11-19 ENCOUNTER — PATIENT MESSAGE (OUTPATIENT)
Dept: OBSTETRICS AND GYNECOLOGY | Facility: CLINIC | Age: 57
End: 2022-11-19
Payer: COMMERCIAL

## 2022-11-19 DIAGNOSIS — Z12.31 ENCOUNTER FOR SCREENING MAMMOGRAM FOR MALIGNANT NEOPLASM OF BREAST: Primary | ICD-10-CM

## 2022-11-29 ENCOUNTER — HOSPITAL ENCOUNTER (OUTPATIENT)
Dept: RADIOLOGY | Facility: HOSPITAL | Age: 57
Discharge: HOME | End: 2022-11-29
Attending: OBSTETRICS & GYNECOLOGY
Payer: COMMERCIAL

## 2022-11-29 ENCOUNTER — OFFICE VISIT (OUTPATIENT)
Dept: OBSTETRICS AND GYNECOLOGY | Facility: CLINIC | Age: 57
End: 2022-11-29
Payer: COMMERCIAL

## 2022-11-29 VITALS
BODY MASS INDEX: 33.41 KG/M2 | SYSTOLIC BLOOD PRESSURE: 142 MMHG | DIASTOLIC BLOOD PRESSURE: 90 MMHG | WEIGHT: 176.81 LBS

## 2022-11-29 DIAGNOSIS — Z01.419 ENCOUNTER FOR ANNUAL ROUTINE GYNECOLOGICAL EXAMINATION: ICD-10-CM

## 2022-11-29 DIAGNOSIS — Z12.4 SCREENING FOR MALIGNANT NEOPLASM OF CERVIX: Primary | ICD-10-CM

## 2022-11-29 DIAGNOSIS — Z12.31 ENCOUNTER FOR SCREENING MAMMOGRAM FOR MALIGNANT NEOPLASM OF BREAST: ICD-10-CM

## 2022-11-29 PROCEDURE — 3008F PR BODY MASS INDEX (BMI) DOCUMENTED: ICD-10-PCS | Mod: CPTII,S$GLB,, | Performed by: OBSTETRICS & GYNECOLOGY

## 2022-11-29 PROCEDURE — 77063 BREAST TOMOSYNTHESIS BI: CPT | Mod: TC,PN

## 2022-11-29 PROCEDURE — 3044F PR MOST RECENT HEMOGLOBIN A1C LEVEL <7.0%: ICD-10-PCS | Mod: CPTII,S$GLB,, | Performed by: OBSTETRICS & GYNECOLOGY

## 2022-11-29 PROCEDURE — 3044F HG A1C LEVEL LT 7.0%: CPT | Mod: CPTII,S$GLB,, | Performed by: OBSTETRICS & GYNECOLOGY

## 2022-11-29 PROCEDURE — 77067 MAMMO DIGITAL SCREENING BILAT WITH TOMO: ICD-10-PCS | Mod: 26,,, | Performed by: RADIOLOGY

## 2022-11-29 PROCEDURE — 99999 PR PBB SHADOW E&M-EST. PATIENT-LVL III: CPT | Mod: PBBFAC,,, | Performed by: OBSTETRICS & GYNECOLOGY

## 2022-11-29 PROCEDURE — 1159F MED LIST DOCD IN RCRD: CPT | Mod: CPTII,S$GLB,, | Performed by: OBSTETRICS & GYNECOLOGY

## 2022-11-29 PROCEDURE — 1160F PR REVIEW ALL MEDS BY PRESCRIBER/CLIN PHARMACIST DOCUMENTED: ICD-10-PCS | Mod: CPTII,S$GLB,, | Performed by: OBSTETRICS & GYNECOLOGY

## 2022-11-29 PROCEDURE — 77067 SCR MAMMO BI INCL CAD: CPT | Mod: 26,,, | Performed by: RADIOLOGY

## 2022-11-29 PROCEDURE — 77063 MAMMO DIGITAL SCREENING BILAT WITH TOMO: ICD-10-PCS | Mod: 26,,, | Performed by: RADIOLOGY

## 2022-11-29 PROCEDURE — 77067 SCR MAMMO BI INCL CAD: CPT | Mod: TC,PN

## 2022-11-29 PROCEDURE — 77063 BREAST TOMOSYNTHESIS BI: CPT | Mod: 26,,, | Performed by: RADIOLOGY

## 2022-11-29 PROCEDURE — 99999 PR PBB SHADOW E&M-EST. PATIENT-LVL III: ICD-10-PCS | Mod: PBBFAC,,, | Performed by: OBSTETRICS & GYNECOLOGY

## 2022-11-29 PROCEDURE — 1160F RVW MEDS BY RX/DR IN RCRD: CPT | Mod: CPTII,S$GLB,, | Performed by: OBSTETRICS & GYNECOLOGY

## 2022-11-29 PROCEDURE — 3077F SYST BP >= 140 MM HG: CPT | Mod: CPTII,S$GLB,, | Performed by: OBSTETRICS & GYNECOLOGY

## 2022-11-29 PROCEDURE — 1159F PR MEDICATION LIST DOCUMENTED IN MEDICAL RECORD: ICD-10-PCS | Mod: CPTII,S$GLB,, | Performed by: OBSTETRICS & GYNECOLOGY

## 2022-11-29 PROCEDURE — 3008F BODY MASS INDEX DOCD: CPT | Mod: CPTII,S$GLB,, | Performed by: OBSTETRICS & GYNECOLOGY

## 2022-11-29 PROCEDURE — 3077F PR MOST RECENT SYSTOLIC BLOOD PRESSURE >= 140 MM HG: ICD-10-PCS | Mod: CPTII,S$GLB,, | Performed by: OBSTETRICS & GYNECOLOGY

## 2022-11-29 PROCEDURE — 99396 PREV VISIT EST AGE 40-64: CPT | Mod: S$GLB,,, | Performed by: OBSTETRICS & GYNECOLOGY

## 2022-11-29 PROCEDURE — 88175 CYTOPATH C/V AUTO FLUID REDO: CPT | Performed by: OBSTETRICS & GYNECOLOGY

## 2022-11-29 PROCEDURE — 99396 PR PREVENTIVE VISIT,EST,40-64: ICD-10-PCS | Mod: S$GLB,,, | Performed by: OBSTETRICS & GYNECOLOGY

## 2022-11-29 PROCEDURE — 3080F DIAST BP >= 90 MM HG: CPT | Mod: CPTII,S$GLB,, | Performed by: OBSTETRICS & GYNECOLOGY

## 2022-11-29 PROCEDURE — 3080F PR MOST RECENT DIASTOLIC BLOOD PRESSURE >= 90 MM HG: ICD-10-PCS | Mod: CPTII,S$GLB,, | Performed by: OBSTETRICS & GYNECOLOGY

## 2022-11-29 NOTE — PROGRESS NOTES
Chief Complaint   Patient presents with    Well Woman       History and Physical:  Patient's last menstrual period was 2012.       Cassandra Francis is a 57 y.o.  F who presents today for her routine annual GYN exam. The patient has no Gynecology complaints today. annual      Allergies: Review of patient's allergies indicates:  No Known Allergies    Past Medical History:   Diagnosis Date    Colon polyp     Diverticulosis        Past Surgical History:   Procedure Laterality Date     SECTION, CLASSIC         MEDS:   No current outpatient medications on file prior to visit.     No current facility-administered medications on file prior to visit.       OB History          1    Para   1    Term   1            AB        Living             SAB        IAB        Ectopic        Multiple        Live Births                     Social History     Socioeconomic History    Marital status:     Number of children: 1   Occupational History    Occupation: not employed   Tobacco Use    Smoking status: Never    Smokeless tobacco: Never   Substance and Sexual Activity    Alcohol use: No    Drug use: No    Sexual activity: Not Currently   Social History Narrative    From [place]       Family History   Problem Relation Age of Onset    Hyperlipidemia Sister     Multiple myeloma Sister          Past medical and surgical history reviewed.   I have reviewed the patient's medical history in detail and updated the computerized patient record.        Review of System:   General: no chills, fever, night sweats, weight gain or weight loss  Psychological: no depression or suicidal ideation  Breasts: no new or changing breast lumps, nipple discharge or masses.  Respiratory: no cough, shortness of breath, or wheezing  Cardiovascular: no chest pain or dyspnea on exertion  Gastrointestinal: no abdominal pain, change in bowel habits, or black or bloody stools  Genito-Urinary: no incontinence, urinary  frequency/urgency or vulvar/vaginal symptoms, pelvic pain or abnormal vaginal bleeding.  Musculoskeletal: no gait disturbance or muscular weakness      Physical Exam:   BP (!) 142/90   Wt 80.2 kg (176 lb 12.9 oz)   LMP 05/14/2012   BMI 33.41 kg/m²   Constitutional: She appears alert and responsive. She appears well-developed, well-groomed, and well-nourished. No distress.   HENT:   Head: Normocephalic and atraumatic.   Eyes: Conjunctivae and EOM are normal. No scleral icterus.   Neck: Symmetrical. Normal range of motion. Neck supple. No tracheal deviation present. THYROID:  without masses or tenderness.  Cardiovascular: Normal rate, no rhythm abnormality noted. Extremities without swelling or edema, warm.    Pulmonary/Chest: Normal respiratory Effort. No distress or retractions. She exhibits no tenderness.  Breasts: are symmetrical.no masses    Right breast exhibits no inverted nipple, no mass, no nipple discharge, no skin change and no tenderness.   Left breast exhibits no inverted nipple, no mass, no nipple discharge, no skin change and no tenderness.  Abdominal: Soft. She exhibits no distension, hernias or masses. There is no tenderness. No enlargement of liver edge or spleen.  There is no rebound and no guarding.   Genitourinary:    External rectal exam shows no thrombosed external hemorrhoids, no lesions.     Pelvic exam was performed with patient supine.   No labial fusion, and symmetrical.    There is no rash, lesion or injury on the right labia.   There is no rash, lesion or injury on the left labia.   No bleeding and no signs of injury around the vaginal introitus, urethral meatus is normal size and without prolapse or lesions, urethra well supported. The cervix is visualized with no discharge, lesions or friability.   No vaginal discharge found.    No significant Cystocele, Enterocele or rectocele, and cervix and uterus well supported.   Bimanual exam:   The urethra is normal to palpation and there are  no palpable vaginal wall masses.   Uterus is not deviated, not enlarged, not fixed, normal shape and not tender.   Cervix exhibits no motion tenderness.    Right adnexum displays no mass or nodularity and no tenderness.   Left adnexum displays no mass or nodularity and no tenderness.  Musculoskeletal: Normal range of motion.   Lymphadenopathy: No inguinal adenopathy present.   Neurological: She is alert and oriented to person, place, and time. Coordination normal.   Skin: Skin is warm and dry. She is not diaphoretic. No rashes, lesions or ulcers.   Psychiatric: She has a normal mood and affect, oriented to person, place, and time.      Assessment:   Normal annual GYN exam  1. Screening for malignant neoplasm of cervix  Liquid-Based Pap Smear, Screening    Mammo Digital Screening Bilat      2. Encounter for annual routine gynecological examination  Mammo Digital Screening Bilat        Menopause  Prev c/s    Plan:   PAP  Mammogram  Follow up in 1 year.  Patient informed will be contacted with results within 2 weeks. Encouraged to please call back or email if she has not heard from us by then.

## 2023-03-03 ENCOUNTER — OFFICE VISIT (OUTPATIENT)
Dept: FAMILY MEDICINE | Facility: CLINIC | Age: 58
End: 2023-03-03
Payer: COMMERCIAL

## 2023-03-03 ENCOUNTER — HOSPITAL ENCOUNTER (OUTPATIENT)
Dept: RADIOLOGY | Facility: HOSPITAL | Age: 58
Discharge: HOME | End: 2023-03-03
Attending: NURSE PRACTITIONER
Payer: COMMERCIAL

## 2023-03-03 VITALS
DIASTOLIC BLOOD PRESSURE: 86 MMHG | BODY MASS INDEX: 33.17 KG/M2 | WEIGHT: 175.69 LBS | OXYGEN SATURATION: 95 % | HEIGHT: 61 IN | HEART RATE: 82 BPM | SYSTOLIC BLOOD PRESSURE: 138 MMHG

## 2023-03-03 DIAGNOSIS — M79.672 ACUTE FOOT PAIN, LEFT: Primary | ICD-10-CM

## 2023-03-03 DIAGNOSIS — M79.672 ACUTE FOOT PAIN, LEFT: ICD-10-CM

## 2023-03-03 PROCEDURE — 3075F SYST BP GE 130 - 139MM HG: CPT | Mod: CPTII,S$GLB,, | Performed by: NURSE PRACTITIONER

## 2023-03-03 PROCEDURE — 73630 XR FOOT COMPLETE 3 VIEW LEFT: ICD-10-PCS | Mod: 26,LT,, | Performed by: RADIOLOGY

## 2023-03-03 PROCEDURE — 73630 X-RAY EXAM OF FOOT: CPT | Mod: 26,LT,, | Performed by: RADIOLOGY

## 2023-03-03 PROCEDURE — 73630 X-RAY EXAM OF FOOT: CPT | Mod: TC,FY,PO,LT

## 2023-03-03 PROCEDURE — 3008F BODY MASS INDEX DOCD: CPT | Mod: CPTII,S$GLB,, | Performed by: NURSE PRACTITIONER

## 2023-03-03 PROCEDURE — 3079F DIAST BP 80-89 MM HG: CPT | Mod: CPTII,S$GLB,, | Performed by: NURSE PRACTITIONER

## 2023-03-03 PROCEDURE — 99214 OFFICE O/P EST MOD 30 MIN: CPT | Mod: S$GLB,,, | Performed by: NURSE PRACTITIONER

## 2023-03-03 PROCEDURE — 99999 PR PBB SHADOW E&M-EST. PATIENT-LVL III: CPT | Mod: PBBFAC,,, | Performed by: NURSE PRACTITIONER

## 2023-03-03 PROCEDURE — 3008F PR BODY MASS INDEX (BMI) DOCUMENTED: ICD-10-PCS | Mod: CPTII,S$GLB,, | Performed by: NURSE PRACTITIONER

## 2023-03-03 PROCEDURE — 99214 PR OFFICE/OUTPT VISIT, EST, LEVL IV, 30-39 MIN: ICD-10-PCS | Mod: S$GLB,,, | Performed by: NURSE PRACTITIONER

## 2023-03-03 PROCEDURE — 3075F PR MOST RECENT SYSTOLIC BLOOD PRESS GE 130-139MM HG: ICD-10-PCS | Mod: CPTII,S$GLB,, | Performed by: NURSE PRACTITIONER

## 2023-03-03 PROCEDURE — 3079F PR MOST RECENT DIASTOLIC BLOOD PRESSURE 80-89 MM HG: ICD-10-PCS | Mod: CPTII,S$GLB,, | Performed by: NURSE PRACTITIONER

## 2023-03-03 PROCEDURE — 99999 PR PBB SHADOW E&M-EST. PATIENT-LVL III: ICD-10-PCS | Mod: PBBFAC,,, | Performed by: NURSE PRACTITIONER

## 2023-03-03 NOTE — PROGRESS NOTES
"Cassandra Francis is a 58 y.o. female patient of Haile Erazo MD who presents to the clinic today for for an in-clinic visit.    HPI    Pt, who is not known to me, reports a  new problem to me: foot pain after stumbling.    Affected area(s):  Notes pain in foot,  Type of difficulty:  Other: left foot pain and swelling, no redness                        Medications:  NSAID used and beneficial    Review of Systems    MSK:  Neg for knee or back pain and Positive for  foot pain    Neuro:  Denies weakness, numbness or tingling.    All other ROS negative.    Physical Exam    Alert, coop 58 y.o. female patient in no acute distress distress, is not ill-appearing.    Vitals:    03/03/23 1029   BP: 138/86   Pulse: 82   SpO2: 95%   Weight: 79.7 kg (175 lb 11.3 oz)   Height: 5' 1" (1.549 m)     VS reviewed.  VS stable..  CC, nursing note, medications & PMH all reviewed today.    HEENT:  Normocephalic, without obvious abnormality, atraumatic                 Ext nose/ears are without lesions or erythema.  No drainage noted.                 Eyes lids symmetrical and with out lesions, conjunctivae not injected.  EOMs intact.              Resp:  Breathing unlabored.  Lungs CTA bilat.  Moves air to bases bilat.  Resp excursion symmetrical.    Heart:  Heart regular rate. and Regular rate and rhythm.    CV:  Cap refill brisk.                 MS:  Ambulates 3. Normal: Walks 20', No Assistive device, no evidence for imbalance. Normal gait pattern., with no ambulation device            Other MSK Exam: left  foot:  swelling, tenderness over the middle of the dorsum of the foot.  No erythema.  NT to palp of the plantar surface of the foot.  Right foot without pain or swelling.  Knees without edema or pain to ROM.         NEURO:  Alert and oriented x 4.  Responds appropriately during interaction.                  Sensation to light touch intact over feet bilat.    Skin:  Warm, dry, color good.                Psych:  Responds " appropriately throughout the visit.               Mood:  pleasant and appropriate               Appearance: well-groomed, appropriate .               Affect:  congruent and appropriate      Acute foot pain, left  -     X-Ray Foot Complete Left; Future; Expected date: 03/03/2023  -     Ambulatory referral/consult to Podiatry; Future; Expected date: 03/10/2023        Pt today presents with   Chief Complaint   Patient presents with    Ankle Injury   .  Noted to have left foot pain by history and exam.  Tenderness over the forefoot    This is a new problem to me.  the above  work up is planned.        Known Diagnostic Lab/Radiological/Pulmonary/CardiacTests Results   Xray left foot Abnormal - arthritis changes in the foot.    Any radiology study is interpreted by radiology.    Prescribing Considerations:  I have reviewed the following additional tests today:  n/a-not prescribed .        Referred to  Podiatry  .                    Education:    Pt advised to perform comfort measures/treatment recommended on patient instruction sheet, which were reviewed at the time of the visit..    Follow Up:    If worse or concerns, the patient is advised to call for advice to this office or the PCP office or call [facility] or go to the nearest URGENT CARE or ER.    Explained exam findings, diagnosis and treatment plan to patient alone.  Questions answered and patient states understanding.

## 2023-03-03 NOTE — PATIENT INSTRUCTIONS
Xray  Activity as tolerated.  Continue to wear a heavier shoe that is close-toed.    Ice or heat.  Ibuprofen as needed for pain.    If you have concerns or questions, please do not hesitate to call.  If you have any questions, please do not hesitate to call.  You can reach us at [phone] Monday through Friday.  Or call  Dr. Erazo.    Thank you for using the [facility]!    TYLOR Sears, CNP, FNP-BC  [facility]    To rate your experience with TARAN Sears, click on the link below:      [email or web address]

## 2023-04-18 ENCOUNTER — TELEPHONE (OUTPATIENT)
Dept: PODIATRY | Facility: CLINIC | Age: 58
End: 2023-04-18
Payer: COMMERCIAL

## 2023-05-09 ENCOUNTER — PATIENT MESSAGE (OUTPATIENT)
Dept: RESEARCH | Facility: HOSPITAL | Age: 58
End: 2023-05-09
Payer: COMMERCIAL

## 2023-07-07 ENCOUNTER — OFFICE VISIT (OUTPATIENT)
Dept: URGENT CARE | Facility: CLINIC | Age: 58
End: 2023-07-07
Payer: COMMERCIAL

## 2023-07-07 VITALS
SYSTOLIC BLOOD PRESSURE: 145 MMHG | HEART RATE: 74 BPM | OXYGEN SATURATION: 99 % | BODY MASS INDEX: 31.15 KG/M2 | TEMPERATURE: 98 F | WEIGHT: 165 LBS | HEIGHT: 61 IN | DIASTOLIC BLOOD PRESSURE: 84 MMHG | RESPIRATION RATE: 20 BRPM

## 2023-07-07 DIAGNOSIS — R39.15 URINARY URGENCY: ICD-10-CM

## 2023-07-07 DIAGNOSIS — R35.0 URINARY FREQUENCY: ICD-10-CM

## 2023-07-07 DIAGNOSIS — R30.0 DYSURIA: Primary | ICD-10-CM

## 2023-07-07 LAB
BILIRUB UR QL STRIP: NEGATIVE
COLOR UR: ABNORMAL
GLUCOSE UR QL STRIP: NEGATIVE
KETONES UR QL STRIP: NEGATIVE
LEUKOCYTE ESTERASE UR QL STRIP: POSITIVE
PH, POC UA: 5.5 (ref 5–8)
POC BLOOD, URINE: POSITIVE
POC NITRATES, URINE: NEGATIVE
PROT UR QL STRIP: NEGATIVE
SP GR UR STRIP: 1 (ref 1–1.03)
UROBILINOGEN UR STRIP-ACNC: NORMAL (ref 0.1–1.1)

## 2023-07-07 PROCEDURE — 99213 PR OFFICE/OUTPT VISIT, EST, LEVL III, 20-29 MIN: ICD-10-PCS | Mod: S$GLB,,, | Performed by: PHYSICIAN ASSISTANT

## 2023-07-07 PROCEDURE — 99213 OFFICE O/P EST LOW 20 MIN: CPT | Mod: S$GLB,,, | Performed by: PHYSICIAN ASSISTANT

## 2023-07-07 PROCEDURE — 81003 POCT URINALYSIS, DIPSTICK, AUTOMATED, W/O SCOPE: ICD-10-PCS | Mod: QW,S$GLB,, | Performed by: PHYSICIAN ASSISTANT

## 2023-07-07 PROCEDURE — 81003 URINALYSIS AUTO W/O SCOPE: CPT | Mod: QW,S$GLB,, | Performed by: PHYSICIAN ASSISTANT

## 2023-07-07 RX ORDER — NITROFURANTOIN 25; 75 MG/1; MG/1
100 CAPSULE ORAL 2 TIMES DAILY
Qty: 10 CAPSULE | Refills: 0 | Status: SHIPPED | OUTPATIENT
Start: 2023-07-07 | End: 2023-07-12

## 2023-07-07 NOTE — PROGRESS NOTES
"Subjective:      Patient ID: Cassandra Francis is a 58 y.o. female.    Vitals:  height is 5' 1" (1.549 m) and weight is 74.8 kg (165 lb). Her oral temperature is 97.8 °F (36.6 °C). Her blood pressure is 145/84 (abnormal) and her pulse is 74. Her respiration is 20 and oxygen saturation is 99%.     Chief Complaint: Urinary Frequency    Urinary Tract Infection   This is a new problem. The current episode started today. The problem occurs every urination. The problem has been gradually worsening. The quality of the pain is described as burning. The pain is at a severity of 2/10. There has been no fever. She is Sexually active. There is No history of pyelonephritis. Associated symptoms include frequency and urgency. Pertinent negatives include no behavior changes, chills, discharge, flank pain, hematuria, hesitancy, nausea, possible pregnancy, sweats, vomiting, weight loss, bubble bath use, constipation or rash. She has tried nothing for the symptoms. There is no history of catheterization, diabetes insipidus, diabetes mellitus, genitourinary reflux, hypertension, kidney stones, recurrent UTIs, a single kidney, STD, urinary stasis or a urological procedure.     Constitution: Negative for chills, sweating, fatigue and fever.   HENT:  Negative for ear pain, drooling, congestion, sore throat, trouble swallowing and voice change.    Neck: Negative for neck pain, neck stiffness, painful lymph nodes and neck swelling.   Cardiovascular:  Negative for chest pain, leg swelling, palpitations, sob on exertion and passing out.   Eyes:  Negative for eye pain, eye redness, photophobia, double vision, blurred vision and eyelid swelling.   Respiratory:  Negative for chest tightness, cough, sputum production, bloody sputum, shortness of breath, stridor and wheezing.    Gastrointestinal:  Negative for abdominal pain, abdominal bloating, nausea, vomiting, constipation, diarrhea and heartburn.   Genitourinary:  Positive for dysuria, " frequency and urgency. Negative for flank pain, hematuria, vaginal pain, vaginal discharge, vaginal bleeding, vaginal odor, genital sore and pelvic pain.   Musculoskeletal:  Negative for joint pain, joint swelling, abnormal ROM of joint, back pain, muscle cramps and muscle ache.   Skin:  Negative for rash and hives.   Allergic/Immunologic: Negative for seasonal allergies, food allergies, hives, itching and sneezing.   Neurological:  Negative for dizziness, light-headedness, passing out, loss of balance, headaches, altered mental status, loss of consciousness and seizures.   Hematologic/Lymphatic: Negative for swollen lymph nodes.   Psychiatric/Behavioral:  Negative for altered mental status and nervous/anxious. The patient is not nervous/anxious.     Objective:     Physical Exam   Constitutional: She is oriented to person, place, and time. She appears well-developed. She is cooperative.  Non-toxic appearance. She does not appear ill. No distress.   HENT:   Head: Normocephalic and atraumatic.   Ears:   Right Ear: External ear normal.   Left Ear: External ear normal.   Nose: Nose normal. No nasal deformity. No epistaxis.   Mouth/Throat: Uvula is midline, oropharynx is clear and moist and mucous membranes are normal.   Eyes: Conjunctivae and lids are normal. No scleral icterus.   Neck: Trachea normal and phonation normal. Neck supple. No edema present. No erythema present. No neck rigidity present.   Cardiovascular: Normal rate, regular rhythm, normal heart sounds and normal pulses.   Pulmonary/Chest: Effort normal and breath sounds normal. No accessory muscle usage or stridor. No respiratory distress. She has no decreased breath sounds. She has no wheezes. She has no rhonchi. She has no rales.   Abdominal: Normal appearance and bowel sounds are normal. Soft. There is no abdominal tenderness. There is no rebound, no guarding, no left CVA tenderness and no right CVA tenderness.   Musculoskeletal: Normal range of motion.          General: No deformity. Normal range of motion.   Neurological: She is alert and oriented to person, place, and time. She exhibits normal muscle tone. Coordination normal.   Skin: Skin is warm, dry, intact, not diaphoretic, not pale and no rash. Capillary refill takes less than 2 seconds.   Psychiatric: Her speech is normal and behavior is normal. Judgment and thought content normal.   Nursing note and vitals reviewed.    Results for orders placed or performed in visit on 07/07/23   POCT Urinalysis, Dipstick, Automated, W/O Scope   Result Value Ref Range    POC Blood, Urine Positive (A) Negative    POC Bilirubin, Urine Negative Negative    POC Urobilinogen, Urine normal 0.1 - 1.1    POC Ketones, Urine Negative Negative    POC Protein, Urine Negative Negative    POC Nitrates, Urine Negative Negative    POC Glucose, Urine Negative Negative    pH, UA 5.5 5 - 8    POC Specific Gravity, Urine 1.005 1.003 - 1.029    POC Leukocytes, Urine Positive (A) Negative    Color, UA Light Yellow Light Yellow, Yellow       Assessment:     1. Dysuria    2. Urinary frequency    3. Urinary urgency        Plan:   Discussed UA results with patient. Discussed DDX and treatment options with patient - will go ahead and treat empirically with antibiotics due to patient's symptoms at this time. Advised close follow-up with PCP and/or OBGYN and/or Urology for further evaluation as needed. ER precautions given to patient as well. Patient aware, verbalized understanding and agreed with plan of care.    Dysuria  -     POCT Urinalysis, Dipstick, Automated, W/O Scope    Urinary frequency  -     POCT Urinalysis, Dipstick, Automated, W/O Scope    Urinary urgency  -     POCT Urinalysis, Dipstick, Automated, W/O Scope    Other orders  -     nitrofurantoin, macrocrystal-monohydrate, (MACROBID) 100 MG capsule; Take 1 capsule (100 mg total) by mouth 2 (two) times daily. for 5 days  Dispense: 10 capsule; Refill: 0      Patient Instructions   You must  understand that you've received an Urgent Care treatment only and that you may be released before all your medical problems are known or treated. You, the patient, will arrange for follow up care as instructed.  Follow up with your PCP or specialty clinic as directed if not improved or as needed. You can call [phone] to schedule an appointment with the appropriate provider.  If your condition worsens we recommend that you receive another evaluation at the Emergency Department for any concerns or worsening of condition.  Patient aware and verbalized understanding.    Discussed UA results with patient.  Take antibiotics as prescribed to full completion - take with daily probiotic.  Macrobid RX as prescribed for UTI.  Advised patient to follow-up with PCP and/or Specialist for further evaluation as needed.  Strict ER precautions given to patient.  Patient aware, verbalized understanding and agreed with plan of care.

## 2023-07-07 NOTE — PATIENT INSTRUCTIONS
You must understand that you've received an Urgent Care treatment only and that you may be released before all your medical problems are known or treated. You, the patient, will arrange for follow up care as instructed.  Follow up with your PCP or specialty clinic as directed if not improved or as needed. You can call [phone] to schedule an appointment with the appropriate provider.  If your condition worsens we recommend that you receive another evaluation at the Emergency Department for any concerns or worsening of condition.  Patient aware and verbalized understanding.    Discussed UA results with patient.  Take antibiotics as prescribed to full completion - take with daily probiotic.  Macrobid RX as prescribed for UTI.  Advised patient to follow-up with PCP and/or Specialist for further evaluation as needed.  Strict ER precautions given to patient.  Patient aware, verbalized understanding and agreed with plan of care.

## 2023-11-15 ENCOUNTER — LAB VISIT (OUTPATIENT)
Dept: LAB | Facility: HOSPITAL | Age: 58
End: 2023-11-15
Attending: FAMILY MEDICINE
Payer: COMMERCIAL

## 2023-11-15 ENCOUNTER — OFFICE VISIT (OUTPATIENT)
Dept: FAMILY MEDICINE | Facility: CLINIC | Age: 58
End: 2023-11-15
Payer: COMMERCIAL

## 2023-11-15 VITALS
OXYGEN SATURATION: 100 % | WEIGHT: 163.13 LBS | HEART RATE: 83 BPM | RESPIRATION RATE: 18 BRPM | HEIGHT: 61 IN | SYSTOLIC BLOOD PRESSURE: 128 MMHG | DIASTOLIC BLOOD PRESSURE: 74 MMHG | BODY MASS INDEX: 30.8 KG/M2

## 2023-11-15 DIAGNOSIS — Z00.00 PREVENTATIVE HEALTH CARE: ICD-10-CM

## 2023-11-15 DIAGNOSIS — Z00.00 PREVENTATIVE HEALTH CARE: Primary | ICD-10-CM

## 2023-11-15 LAB
25(OH)D3+25(OH)D2 SERPL-MCNC: 66 NG/ML (ref 30–96)
ALBUMIN SERPL BCP-MCNC: 3.9 G/DL (ref 3.5–5.2)
ALP SERPL-CCNC: 94 U/L (ref 55–135)
ALT SERPL W/O P-5'-P-CCNC: 12 U/L (ref 10–44)
ANION GAP SERPL CALC-SCNC: 9 MMOL/L (ref 8–16)
AST SERPL-CCNC: 15 U/L (ref 10–40)
BASOPHILS # BLD AUTO: 0.04 K/UL (ref 0–0.2)
BASOPHILS NFR BLD: 0.5 % (ref 0–1.9)
BILIRUB SERPL-MCNC: 0.4 MG/DL (ref 0.1–1)
BUN SERPL-MCNC: 8 MG/DL (ref 6–20)
CALCIUM SERPL-MCNC: 10 MG/DL (ref 8.7–10.5)
CHLORIDE SERPL-SCNC: 104 MMOL/L (ref 95–110)
CHOLEST SERPL-MCNC: 223 MG/DL (ref 120–199)
CHOLEST/HDLC SERPL: 3.9 {RATIO} (ref 2–5)
CO2 SERPL-SCNC: 27 MMOL/L (ref 23–29)
CREAT SERPL-MCNC: 0.7 MG/DL (ref 0.5–1.4)
DIFFERENTIAL METHOD: NORMAL
EOSINOPHIL # BLD AUTO: 0.1 K/UL (ref 0–0.5)
EOSINOPHIL NFR BLD: 1 % (ref 0–8)
ERYTHROCYTE [DISTWIDTH] IN BLOOD BY AUTOMATED COUNT: 12.5 % (ref 11.5–14.5)
EST. GFR  (NO RACE VARIABLE): >60 ML/MIN/1.73 M^2
ESTIMATED AVG GLUCOSE: 105 MG/DL (ref 68–131)
GLUCOSE SERPL-MCNC: 87 MG/DL (ref 70–110)
HBA1C MFR BLD: 5.3 % (ref 4–5.6)
HCT VFR BLD AUTO: 37.5 % (ref 37–48.5)
HDLC SERPL-MCNC: 57 MG/DL (ref 40–75)
HDLC SERPL: 25.6 % (ref 20–50)
HGB BLD-MCNC: 12.9 G/DL (ref 12–16)
IMM GRANULOCYTES # BLD AUTO: 0.01 K/UL (ref 0–0.04)
IMM GRANULOCYTES NFR BLD AUTO: 0.1 % (ref 0–0.5)
LDLC SERPL CALC-MCNC: 143 MG/DL (ref 63–159)
LYMPHOCYTES # BLD AUTO: 2.6 K/UL (ref 1–4.8)
LYMPHOCYTES NFR BLD: 33.9 % (ref 18–48)
MCH RBC QN AUTO: 30.9 PG (ref 27–31)
MCHC RBC AUTO-ENTMCNC: 34.4 G/DL (ref 32–36)
MCV RBC AUTO: 90 FL (ref 82–98)
MONOCYTES # BLD AUTO: 0.4 K/UL (ref 0.3–1)
MONOCYTES NFR BLD: 4.5 % (ref 4–15)
NEUTROPHILS # BLD AUTO: 4.7 K/UL (ref 1.8–7.7)
NEUTROPHILS NFR BLD: 60 % (ref 38–73)
NONHDLC SERPL-MCNC: 166 MG/DL
NRBC BLD-RTO: 0 /100 WBC
PLATELET # BLD AUTO: 310 K/UL (ref 150–450)
PMV BLD AUTO: 11.5 FL (ref 9.2–12.9)
POTASSIUM SERPL-SCNC: 3.9 MMOL/L (ref 3.5–5.1)
PROT SERPL-MCNC: 7.6 G/DL (ref 6–8.4)
RBC # BLD AUTO: 4.18 M/UL (ref 4–5.4)
SODIUM SERPL-SCNC: 140 MMOL/L (ref 136–145)
TRIGL SERPL-MCNC: 115 MG/DL (ref 30–150)
WBC # BLD AUTO: 7.79 K/UL (ref 3.9–12.7)

## 2023-11-15 PROCEDURE — 80061 LIPID PANEL: CPT | Performed by: FAMILY MEDICINE

## 2023-11-15 PROCEDURE — 1159F PR MEDICATION LIST DOCUMENTED IN MEDICAL RECORD: ICD-10-PCS | Mod: CPTII,S$GLB,, | Performed by: FAMILY MEDICINE

## 2023-11-15 PROCEDURE — 80053 COMPREHEN METABOLIC PANEL: CPT | Performed by: FAMILY MEDICINE

## 2023-11-15 PROCEDURE — 99396 PREV VISIT EST AGE 40-64: CPT | Mod: S$GLB,,, | Performed by: FAMILY MEDICINE

## 2023-11-15 PROCEDURE — 36415 COLL VENOUS BLD VENIPUNCTURE: CPT | Mod: PO | Performed by: FAMILY MEDICINE

## 2023-11-15 PROCEDURE — 3008F PR BODY MASS INDEX (BMI) DOCUMENTED: ICD-10-PCS | Mod: CPTII,S$GLB,, | Performed by: FAMILY MEDICINE

## 2023-11-15 PROCEDURE — 3078F DIAST BP <80 MM HG: CPT | Mod: CPTII,S$GLB,, | Performed by: FAMILY MEDICINE

## 2023-11-15 PROCEDURE — 99999 PR PBB SHADOW E&M-EST. PATIENT-LVL III: ICD-10-PCS | Mod: PBBFAC,,, | Performed by: FAMILY MEDICINE

## 2023-11-15 PROCEDURE — 3074F SYST BP LT 130 MM HG: CPT | Mod: CPTII,S$GLB,, | Performed by: FAMILY MEDICINE

## 2023-11-15 PROCEDURE — 99999 PR PBB SHADOW E&M-EST. PATIENT-LVL III: CPT | Mod: PBBFAC,,, | Performed by: FAMILY MEDICINE

## 2023-11-15 PROCEDURE — 85025 COMPLETE CBC W/AUTO DIFF WBC: CPT | Performed by: FAMILY MEDICINE

## 2023-11-15 PROCEDURE — 3008F BODY MASS INDEX DOCD: CPT | Mod: CPTII,S$GLB,, | Performed by: FAMILY MEDICINE

## 2023-11-15 PROCEDURE — 3074F PR MOST RECENT SYSTOLIC BLOOD PRESSURE < 130 MM HG: ICD-10-PCS | Mod: CPTII,S$GLB,, | Performed by: FAMILY MEDICINE

## 2023-11-15 PROCEDURE — 83036 HEMOGLOBIN GLYCOSYLATED A1C: CPT | Performed by: FAMILY MEDICINE

## 2023-11-15 PROCEDURE — 1159F MED LIST DOCD IN RCRD: CPT | Mod: CPTII,S$GLB,, | Performed by: FAMILY MEDICINE

## 2023-11-15 PROCEDURE — 99396 PR PREVENTIVE VISIT,EST,40-64: ICD-10-PCS | Mod: S$GLB,,, | Performed by: FAMILY MEDICINE

## 2023-11-15 PROCEDURE — 82306 VITAMIN D 25 HYDROXY: CPT | Performed by: FAMILY MEDICINE

## 2023-11-15 PROCEDURE — 3078F PR MOST RECENT DIASTOLIC BLOOD PRESSURE < 80 MM HG: ICD-10-PCS | Mod: CPTII,S$GLB,, | Performed by: FAMILY MEDICINE

## 2023-11-15 RX ORDER — CALCIUM CARBONATE 600 MG
600 TABLET ORAL ONCE
COMMUNITY

## 2023-11-15 NOTE — PROGRESS NOTES
Dictation #1  MRN:[id number]  CSN:[id number] Subjective:       Patient ID: Cassandra Francis is a 58 y.o. female.    Chief Complaint: Preventative Health Care (Physical)      Pt here for annual visit.      Pt follows with gynecologist, Dr. Dev Valencia for pap and mammogram.    No past medical history on file.    Exercising some walking.    Past Surgical History:     SECTION, CLASSIC                                      No Known Allergies    Social History    Marital Status:                       Occupational History  Occupation          Employer            Comment               not employed                                Social History Main Topics    Smoking Status: Never Smoker                      Smokeless Status: Never Used                        Alcohol Use: No              Drug Use: No              Sexual Activity: Not on file          No current outpatient medications on file prior to visit.  No current facility-administered medications on file prior to visit.    No family history on file.        Review of Systems   Constitutional:  Negative for activity change, appetite change, fatigue and unexpected weight change.   HENT:  Negative for congestion, dental problem, ear pain, hearing loss, nosebleeds, postnasal drip, sinus pressure and tinnitus.    Eyes:  Negative for pain, discharge and visual disturbance.   Respiratory:  Negative for cough, choking, chest tightness, shortness of breath and wheezing.    Cardiovascular:  Negative for chest pain, palpitations and leg swelling.   Gastrointestinal:  Negative for abdominal distention, abdominal pain, blood in stool, constipation, diarrhea, nausea and vomiting.             Genitourinary: Negative.    Musculoskeletal:  Positive for arthralgias (left shoulder). Negative for back pain, gait problem, joint swelling and myalgias.   Skin:  Negative for color change, pallor, rash and wound.   Neurological:  Negative for dizziness, tremors, syncope, weakness,  "light-headedness, numbness and headaches.   Hematological:  Negative for adenopathy. Does not bruise/bleed easily.   Psychiatric/Behavioral:  Negative for agitation, confusion, dysphoric mood and sleep disturbance. The patient is not nervous/anxious.            Objective:      /74   Pulse 83   Resp 18   Ht 5' 1" (1.549 m)   Wt 74 kg (163 lb 2.3 oz)   LMP 05/14/2012   SpO2 100%   BMI 30.83 kg/m²     Physical Exam  Constitutional:       Appearance: Normal appearance. She is well-developed.   HENT:      Head: Normocephalic.      Mouth/Throat:      Pharynx: No oropharyngeal exudate or posterior oropharyngeal erythema.   Eyes:      Conjunctiva/sclera: Conjunctivae normal.      Pupils: Pupils are equal, round, and reactive to light.   Neck:      Thyroid: No thyromegaly.   Cardiovascular:      Rate and Rhythm: Normal rate and regular rhythm.      Heart sounds: Normal heart sounds, S1 normal and S2 normal. No murmur heard.     No friction rub. No gallop.   Pulmonary:      Effort: Pulmonary effort is normal.      Breath sounds: Normal breath sounds. No wheezing or rales.   Abdominal:      General: Bowel sounds are normal. There is no distension.      Palpations: Abdomen is soft. There is no mass.      Tenderness: There is no abdominal tenderness. There is no guarding or rebound.   Musculoskeletal:      Left shoulder: Tenderness present. No deformity. Decreased range of motion.      Cervical back: Normal range of motion and neck supple.      Right lower leg: No edema.      Left lower leg: No edema.   Lymphadenopathy:      Cervical: No cervical adenopathy.   Skin:     General: Skin is warm.      Findings: No rash.   Neurological:      Mental Status: She is alert and oriented to person, place, and time.      Cranial Nerves: No cranial nerve deficit.      Gait: Gait normal.             Assessment:          Plan:        Cassandra was seen today for preventative health care.    Diagnoses and all orders for this " visit:    Preventative health care  -     CBC Auto Differential; Future  -     Comprehensive Metabolic Panel; Future  -     Lipid Panel; Future  -     Hemoglobin A1C; Future  -     Vitamin D; Future          labs today  Covid booster soon  Counseled on regular exercise, maintenance of a healthy weight, balanced diet rich in fruits/vegetables and lean protein, and avoidance of unhealthy habits like smoking and excessive alcohol intake.

## 2023-11-29 ENCOUNTER — HOSPITAL ENCOUNTER (OUTPATIENT)
Dept: RADIOLOGY | Facility: HOSPITAL | Age: 58
Discharge: HOME | End: 2023-11-29
Attending: OBSTETRICS & GYNECOLOGY
Payer: COMMERCIAL

## 2023-11-29 ENCOUNTER — OFFICE VISIT (OUTPATIENT)
Dept: OBSTETRICS AND GYNECOLOGY | Facility: CLINIC | Age: 58
End: 2023-11-29
Payer: COMMERCIAL

## 2023-11-29 VITALS
RESPIRATION RATE: 18 BRPM | BODY MASS INDEX: 31.23 KG/M2 | WEIGHT: 165.38 LBS | HEIGHT: 61 IN | DIASTOLIC BLOOD PRESSURE: 86 MMHG | SYSTOLIC BLOOD PRESSURE: 124 MMHG

## 2023-11-29 DIAGNOSIS — Z01.419 ENCOUNTER FOR ROUTINE GYNECOLOGICAL EXAMINATION WITH PAPANICOLAOU SMEAR OF CERVIX: Primary | ICD-10-CM

## 2023-11-29 DIAGNOSIS — Z01.419 ENCOUNTER FOR ANNUAL ROUTINE GYNECOLOGICAL EXAMINATION: ICD-10-CM

## 2023-11-29 DIAGNOSIS — Z12.4 SCREENING FOR MALIGNANT NEOPLASM OF CERVIX: ICD-10-CM

## 2023-11-29 DIAGNOSIS — Z12.31 SCREENING MAMMOGRAM FOR BREAST CANCER: ICD-10-CM

## 2023-11-29 PROCEDURE — 1159F MED LIST DOCD IN RCRD: CPT | Mod: CPTII,S$GLB,, | Performed by: OBSTETRICS & GYNECOLOGY

## 2023-11-29 PROCEDURE — 3079F DIAST BP 80-89 MM HG: CPT | Mod: CPTII,S$GLB,, | Performed by: OBSTETRICS & GYNECOLOGY

## 2023-11-29 PROCEDURE — 3008F BODY MASS INDEX DOCD: CPT | Mod: CPTII,S$GLB,, | Performed by: OBSTETRICS & GYNECOLOGY

## 2023-11-29 PROCEDURE — 3044F PR MOST RECENT HEMOGLOBIN A1C LEVEL <7.0%: ICD-10-PCS | Mod: CPTII,S$GLB,, | Performed by: OBSTETRICS & GYNECOLOGY

## 2023-11-29 PROCEDURE — 99396 PREV VISIT EST AGE 40-64: CPT | Mod: S$GLB,,, | Performed by: OBSTETRICS & GYNECOLOGY

## 2023-11-29 PROCEDURE — 77067 SCR MAMMO BI INCL CAD: CPT | Mod: TC,PN

## 2023-11-29 PROCEDURE — 1159F PR MEDICATION LIST DOCUMENTED IN MEDICAL RECORD: ICD-10-PCS | Mod: CPTII,S$GLB,, | Performed by: OBSTETRICS & GYNECOLOGY

## 2023-11-29 PROCEDURE — 77063 BREAST TOMOSYNTHESIS BI: CPT | Mod: 26,,, | Performed by: RADIOLOGY

## 2023-11-29 PROCEDURE — 99999 PR PBB SHADOW E&M-EST. PATIENT-LVL III: ICD-10-PCS | Mod: PBBFAC,,, | Performed by: OBSTETRICS & GYNECOLOGY

## 2023-11-29 PROCEDURE — 3074F SYST BP LT 130 MM HG: CPT | Mod: CPTII,S$GLB,, | Performed by: OBSTETRICS & GYNECOLOGY

## 2023-11-29 PROCEDURE — 99999 PR PBB SHADOW E&M-EST. PATIENT-LVL III: CPT | Mod: PBBFAC,,, | Performed by: OBSTETRICS & GYNECOLOGY

## 2023-11-29 PROCEDURE — 77067 MAMMO DIGITAL SCREENING BILAT WITH TOMO: ICD-10-PCS | Mod: 26,,, | Performed by: RADIOLOGY

## 2023-11-29 PROCEDURE — 77063 MAMMO DIGITAL SCREENING BILAT WITH TOMO: ICD-10-PCS | Mod: 26,,, | Performed by: RADIOLOGY

## 2023-11-29 PROCEDURE — 77067 SCR MAMMO BI INCL CAD: CPT | Mod: 26,,, | Performed by: RADIOLOGY

## 2023-11-29 PROCEDURE — 3079F PR MOST RECENT DIASTOLIC BLOOD PRESSURE 80-89 MM HG: ICD-10-PCS | Mod: CPTII,S$GLB,, | Performed by: OBSTETRICS & GYNECOLOGY

## 2023-11-29 PROCEDURE — 3074F PR MOST RECENT SYSTOLIC BLOOD PRESSURE < 130 MM HG: ICD-10-PCS | Mod: CPTII,S$GLB,, | Performed by: OBSTETRICS & GYNECOLOGY

## 2023-11-29 PROCEDURE — 1160F PR REVIEW ALL MEDS BY PRESCRIBER/CLIN PHARMACIST DOCUMENTED: ICD-10-PCS | Mod: CPTII,S$GLB,, | Performed by: OBSTETRICS & GYNECOLOGY

## 2023-11-29 PROCEDURE — 99396 PR PREVENTIVE VISIT,EST,40-64: ICD-10-PCS | Mod: S$GLB,,, | Performed by: OBSTETRICS & GYNECOLOGY

## 2023-11-29 PROCEDURE — 1160F RVW MEDS BY RX/DR IN RCRD: CPT | Mod: CPTII,S$GLB,, | Performed by: OBSTETRICS & GYNECOLOGY

## 2023-11-29 PROCEDURE — 88175 CYTOPATH C/V AUTO FLUID REDO: CPT | Performed by: OBSTETRICS & GYNECOLOGY

## 2023-11-29 PROCEDURE — 3044F HG A1C LEVEL LT 7.0%: CPT | Mod: CPTII,S$GLB,, | Performed by: OBSTETRICS & GYNECOLOGY

## 2023-11-29 PROCEDURE — 3008F PR BODY MASS INDEX (BMI) DOCUMENTED: ICD-10-PCS | Mod: CPTII,S$GLB,, | Performed by: OBSTETRICS & GYNECOLOGY

## 2023-11-29 NOTE — PROGRESS NOTES
Chief Complaint   Patient presents with    Well Woman       History and Physical:  Patient's last menstrual period was 2012.       Cassandra Francis is a 58 y.o.  F who presents today for her routine annual GYN exam. The patient has no Gynecology complaints today.       Allergies: Review of patient's allergies indicates:  No Known Allergies    Past Medical History:   Diagnosis Date    Colon polyp     Diverticulosis        Past Surgical History:   Procedure Laterality Date     SECTION, CLASSIC         MEDS:   Current Outpatient Medications on File Prior to Visit   Medication Sig Dispense Refill    calcium carbonate (OS-DIANA) 600 mg calcium (1,500 mg) Tab Take 600 mg by mouth once.      multivit-min/iron/FA/vit K/lut (CENTRUM SILVER WOMEN ORAL) Take by mouth.       No current facility-administered medications on file prior to visit.       OB History          1    Para   1    Term   1            AB        Living             SAB        IAB        Ectopic        Multiple        Live Births                     Social History     Socioeconomic History    Marital status:     Number of children: 1   Occupational History    Occupation: not employed   Tobacco Use    Smoking status: Never    Smokeless tobacco: Never   Substance and Sexual Activity    Alcohol use: No    Drug use: No    Sexual activity: Not Currently   Social History Narrative    From [place]       Family History   Problem Relation Age of Onset    Hyperlipidemia Sister     Multiple myeloma Sister          Past medical and surgical history reviewed.   I have reviewed the patient's medical history in detail and updated the computerized patient record.        Review of System:   General: no chills, fever, night sweats, weight gain or weight loss  Psychological: no depression or suicidal ideation  Breasts: no new or changing breast lumps, nipple discharge or masses.  Respiratory: no cough, shortness of breath, or  "wheezing  Cardiovascular: no chest pain or dyspnea on exertion  Gastrointestinal: no abdominal pain, change in bowel habits, or black or bloody stools  Genito-Urinary: no incontinence, urinary frequency/urgency or vulvar/vaginal symptoms, pelvic pain or abnormal vaginal bleeding.  Musculoskeletal: no gait disturbance or muscular weakness      Physical Exam:   /86   Resp 18   Ht 5' 1" (1.549 m)   Wt 75 kg (165 lb 5.5 oz)   LMP 05/14/2012   BMI 31.24 kg/m²   Constitutional: She appears alert and responsive. She appears well-developed, well-groomed, and well-nourished. No distress.   HENT:   Head: Normocephalic and atraumatic.   Eyes: Conjunctivae and EOM are normal. No scleral icterus.   Neck: Symmetrical. Normal range of motion. Neck supple. No tracheal deviation present. THYROID:  without masses or tenderness.  Cardiovascular: Normal rate, no rhythm abnormality noted. Extremities without swelling or edema, warm.    Pulmonary/Chest: Normal respiratory Effort. No distress or retractions. She exhibits no tenderness.  Breasts: are symmetrical.no masses    Right breast exhibits no inverted nipple, no mass, no nipple discharge, no skin change and no tenderness.   Left breast exhibits no inverted nipple, no mass, no nipple discharge, no skin change and no tenderness.  Abdominal: Soft. She exhibits no distension, hernias or masses. There is no tenderness. No enlargement of liver edge or spleen.  There is no rebound and no guarding.   Genitourinary:    External rectal exam shows no thrombosed external hemorrhoids, no lesions.     Pelvic exam was performed with patient supine.   No labial fusion, and symmetrical.    There is no rash, lesion or injury on the right labia.   There is no rash, lesion or injury on the left labia.   No bleeding and no signs of injury around the vaginal introitus, urethral meatus is normal size and without prolapse or lesions, urethra well supported. The cervix is visualized with no " discharge, lesions or friability.   No vaginal discharge found.    No significant Cystocele, Enterocele or rectocele, and cervix and uterus well supported.   Bimanual exam:   The urethra is normal to palpation and there are no palpable vaginal wall masses.   Uterus is not deviated, not enlarged, not fixed, normal shape and not tender.   Cervix exhibits no motion tenderness.    Right adnexum displays no mass or nodularity and no tenderness.   Left adnexum displays no mass or nodularity and no tenderness.  Musculoskeletal: Normal range of motion.   Lymphadenopathy: No inguinal adenopathy present.   Neurological: She is alert and oriented to person, place, and time. Coordination normal.   Skin: Skin is warm and dry. She is not diaphoretic. No rashes, lesions or ulcers.   Psychiatric: She has a normal mood and affect, oriented to person, place, and time.      Assessment:   Normal annual GYN exam  1. Encounter for routine gynecological examination with Papanicolaou smear of cervix  Liquid-Based Pap Smear, Screening        menopause    Plan:   PAP  Mammogram  Follow up in 1 year.  Patient informed will be contacted with results within 2 weeks. Encouraged to please call back or email if she has not heard from us by then.

## 2023-12-06 LAB
FINAL PATHOLOGIC DIAGNOSIS: NORMAL
Lab: NORMAL

## 2024-05-05 ENCOUNTER — OFFICE VISIT (OUTPATIENT)
Dept: URGENT CARE | Facility: CLINIC | Age: 59
End: 2024-05-05
Payer: COMMERCIAL

## 2024-05-05 VITALS
BODY MASS INDEX: 31.15 KG/M2 | OXYGEN SATURATION: 97 % | TEMPERATURE: 98 F | HEART RATE: 83 BPM | SYSTOLIC BLOOD PRESSURE: 158 MMHG | DIASTOLIC BLOOD PRESSURE: 84 MMHG | RESPIRATION RATE: 18 BRPM | HEIGHT: 61 IN | WEIGHT: 165 LBS

## 2024-05-05 DIAGNOSIS — R30.0 DYSURIA: ICD-10-CM

## 2024-05-05 DIAGNOSIS — R31.9 URINARY TRACT INFECTION WITH HEMATURIA, SITE UNSPECIFIED: Primary | ICD-10-CM

## 2024-05-05 DIAGNOSIS — N39.0 URINARY TRACT INFECTION WITH HEMATURIA, SITE UNSPECIFIED: Primary | ICD-10-CM

## 2024-05-05 LAB
BILIRUB UR QL STRIP: POSITIVE
GLUCOSE UR QL STRIP: NEGATIVE
KETONES UR QL STRIP: NEGATIVE
LEUKOCYTE ESTERASE UR QL STRIP: POSITIVE
PH, POC UA: 5
POC BLOOD, URINE: POSITIVE
POC NITRATES, URINE: NEGATIVE
PROT UR QL STRIP: POSITIVE
SP GR UR STRIP: 1 (ref 1–1.03)
UROBILINOGEN UR STRIP-ACNC: ABNORMAL (ref 0.1–1.1)

## 2024-05-05 PROCEDURE — 99213 OFFICE O/P EST LOW 20 MIN: CPT | Mod: S$GLB,,, | Performed by: EMERGENCY MEDICINE

## 2024-05-05 PROCEDURE — 81003 URINALYSIS AUTO W/O SCOPE: CPT | Mod: QW,S$GLB,, | Performed by: EMERGENCY MEDICINE

## 2024-05-05 PROCEDURE — 87086 URINE CULTURE/COLONY COUNT: CPT | Performed by: EMERGENCY MEDICINE

## 2024-05-05 RX ORDER — NITROFURANTOIN 25; 75 MG/1; MG/1
100 CAPSULE ORAL 2 TIMES DAILY
Qty: 14 CAPSULE | Refills: 0 | Status: SHIPPED | OUTPATIENT
Start: 2024-05-05 | End: 2024-05-12

## 2024-05-05 NOTE — PROGRESS NOTES
"Subjective:      Patient ID: Cassandra Francis is a 59 y.o. female.    Vitals:  height is 5' 1" (1.549 m) and weight is 74.8 kg (165 lb). Her temperature is 97.8 °F (36.6 °C). Her blood pressure is 158/84 (abnormal) and her pulse is 83. Her respiration is 18 and oxygen saturation is 97%.     Chief Complaint: Dysuria    Patient states she has dysuria,  urinary frequency and urgency that started this morning. Patient denies fever and has not taken OTC meds. Says she has had 2 other UTIs in past 12 months, last 10 months ago. Seems to happen after intercourse but does urinate before/after.    Dysuria   This is a new problem. The current episode started today. The problem occurs intermittently. The problem has been gradually worsening. The quality of the pain is described as burning. The pain is at a severity of 4/10. The pain is mild. There has been no fever. She is Sexually active. There is No history of pyelonephritis. Associated symptoms include frequency, hesitancy and urgency. Pertinent negatives include no nausea or vomiting. She has tried nothing for the symptoms. The treatment provided no relief.       Constitution: Negative for fatigue and fever.   Gastrointestinal:  Negative for abdominal pain, nausea, vomiting and diarrhea.   Genitourinary:  Positive for dysuria, frequency and urgency.      Objective:     Physical Exam   Constitutional: She is oriented to person, place, and time. She appears well-developed. She does not appear ill.   HENT:   Head: Normocephalic and atraumatic.   Mouth/Throat: Mucous membranes are normal. Mucous membranes are moist.   Eyes: Conjunctivae and lids are normal.   Neck: Trachea normal. Neck supple.   Cardiovascular: Normal rate, regular rhythm and normal heart sounds.   Pulmonary/Chest: Effort normal and breath sounds normal. No respiratory distress.   Abdominal: Normal appearance. She exhibits no distension and no mass. Soft. There is no abdominal tenderness. There is no left CVA " tenderness and no right CVA tenderness.   Neurological: She is alert and oriented to person, place, and time. She has normal strength.   Skin: Skin is warm, dry, intact, not diaphoretic and not pale.   Psychiatric: Her speech is normal and behavior is normal. Judgment and thought content normal.   Nursing note and vitals reviewed.    Results for orders placed or performed in visit on 24   POCT Urinalysis, Dipstick, Automated, W/O Scope   Result Value Ref Range    POC Blood, Urine Positive (A) Negative    POC Bilirubin, Urine Positive (A) Negative    POC Urobilinogen, Urine NORM 0.1 - 1.1    POC Ketones, Urine Negative Negative    POC Protein, Urine Positive (A) Negative    POC Nitrates, Urine Negative Negative    POC Glucose, Urine Negative Negative    pH, UA 5.0     POC Specific Gravity, Urine 1.005 1.003 - 1.029    POC Leukocytes, Urine Positive (A) Negative        Assessment:     1. Urinary tract infection with hematuria, site unspecified    2. Dysuria        Plan:     Sending urine cx due to recurrent UTI. Will start Macrobid.    Urinary tract infection with hematuria, site unspecified  -     CULTURE, URINE  -     nitrofurantoin, macrocrystal-monohydrate, (MACROBID) 100 MG capsule; Take 1 capsule (100 mg total) by mouth 2 (two) times daily. for 7 days  Dispense: 14 capsule; Refill: 0    Dysuria  -     POCT Urinalysis, Dipstick, Automated, W/O Scope        Patient Instructions   Increase water intake. Cranberry products/juices may also help.     Take antibiotic with food.     While on antibiotics, take a daily probiotic such as Align or Culturelle or eat yogurt with live cultures (Activia is one example). This will lessen the chances of stomach upset.     If we sent out a urine culture, results typically return in 2-3 days. Results will go to your portal and you will receive a call from the clinic to discuss your results as well.    Frequent causes of urinary tract infection include the followin)  Improper wiping after a bowel movement. Always wipe front to back.   2) Holding urine in. Always try to empty bladder when you feel the need to do so.     If you are prone to urinary tract infections, consider a daily supplement such as cranberry juice/pills or a product called D-mannose. This may reduce the frequency of infections.     You must understand that you've received an Urgent Care treatment only and that you may be released before all your medical problems are known or treated. You, the patient, will arrange for follow up care as instructed.    Follow up with your PCP or specialty clinic as directed if not improved or as needed. You can call [phone] to schedule an appointment with the appropriate provider.    If your condition worsens we recommend that you receive another evaluation at the Emergency Department for any concerns or worsening of condition.    Patient aware of treatment plan and verbalized understanding.

## 2024-05-05 NOTE — PATIENT INSTRUCTIONS
Increase water intake. Cranberry products/juices may also help.     Take antibiotic with food.     While on antibiotics, take a daily probiotic such as Align or Culturelle or eat yogurt with live cultures (Activia is one example). This will lessen the chances of stomach upset.     If we sent out a urine culture, results typically return in 2-3 days. Results will go to your portal and you will receive a call from the clinic to discuss your results as well.    Frequent causes of urinary tract infection include the followin) Improper wiping after a bowel movement. Always wipe front to back.   2) Holding urine in. Always try to empty bladder when you feel the need to do so.     If you are prone to urinary tract infections, consider a daily supplement such as cranberry juice/pills or a product called D-mannose. This may reduce the frequency of infections.     You must understand that you've received an Urgent Care treatment only and that you may be released before all your medical problems are known or treated. You, the patient, will arrange for follow up care as instructed.    Follow up with your PCP or specialty clinic as directed if not improved or as needed. You can call [phone] to schedule an appointment with the appropriate provider.    If your condition worsens we recommend that you receive another evaluation at the Emergency Department for any concerns or worsening of condition.    Patient aware of treatment plan and verbalized understanding.

## 2024-05-06 LAB — BACTERIA UR CULT: NO GROWTH

## 2024-05-15 ENCOUNTER — OFFICE VISIT (OUTPATIENT)
Dept: OBSTETRICS AND GYNECOLOGY | Facility: CLINIC | Age: 59
End: 2024-05-15
Payer: COMMERCIAL

## 2024-05-15 VITALS
SYSTOLIC BLOOD PRESSURE: 128 MMHG | BODY MASS INDEX: 32.03 KG/M2 | DIASTOLIC BLOOD PRESSURE: 70 MMHG | WEIGHT: 169.56 LBS

## 2024-05-15 DIAGNOSIS — R39.9 URINARY SYMPTOM OR SIGN: Primary | ICD-10-CM

## 2024-05-15 PROCEDURE — 3078F DIAST BP <80 MM HG: CPT | Mod: CPTII,S$GLB,, | Performed by: OBSTETRICS & GYNECOLOGY

## 2024-05-15 PROCEDURE — 3008F BODY MASS INDEX DOCD: CPT | Mod: CPTII,S$GLB,, | Performed by: OBSTETRICS & GYNECOLOGY

## 2024-05-15 PROCEDURE — 99213 OFFICE O/P EST LOW 20 MIN: CPT | Mod: S$GLB,,, | Performed by: OBSTETRICS & GYNECOLOGY

## 2024-05-15 PROCEDURE — 99999 PR PBB SHADOW E&M-EST. PATIENT-LVL III: CPT | Mod: PBBFAC,,, | Performed by: OBSTETRICS & GYNECOLOGY

## 2024-05-15 PROCEDURE — 3074F SYST BP LT 130 MM HG: CPT | Mod: CPTII,S$GLB,, | Performed by: OBSTETRICS & GYNECOLOGY

## 2024-05-15 PROCEDURE — 1160F RVW MEDS BY RX/DR IN RCRD: CPT | Mod: CPTII,S$GLB,, | Performed by: OBSTETRICS & GYNECOLOGY

## 2024-05-15 PROCEDURE — 1159F MED LIST DOCD IN RCRD: CPT | Mod: CPTII,S$GLB,, | Performed by: OBSTETRICS & GYNECOLOGY

## 2024-05-15 RX ORDER — NITROFURANTOIN 25; 75 MG/1; MG/1
100 CAPSULE ORAL
COMMUNITY
End: 2024-06-12 | Stop reason: ALTCHOICE

## 2024-05-15 RX ORDER — TITANIUM DIOXIDE, OCTINOXATE, ZINC OXIDE 4.61; 1.6; .78 G/40ML; G/40ML; G/40ML
CREAM TOPICAL
COMMUNITY

## 2024-05-15 NOTE — PROGRESS NOTES
Chief Complaint   Patient presents with    Follow-up     Urgent care followup for UTI       History of Present Illness   59 y.o. IF   patient presents today for post coital dysuria, seen at Urgent Care, UA + but culture was negative.  Pt s not on HRT and currently declines.  No fever or stone hx    Past medical and surgical history reviewed.   I have reviewed the patient's medical history in detail and updated the computerized patient record.    Review of patient's allergies indicates:  No Known Allergies      Review of Systems -   GEN ROS: negative for - fever  Breast ROS: negative for breast lumps  Genito-Urinary ROS: positive for - vulvar/vaginal symptoms      Physical Examination:  /70   Wt 76.9 kg (169 lb 8.5 oz)   LMP 2012   BMI 32.03 kg/m²    No CVA      Assessment:  UA at urgent care + blood, neg cult  UA today neg  Prob related to coitus and vaginal dryness  1. Urinary symptom or sign            Plan:  Pt decline estrace cream at this time  Recheck UA 4 weeks if hematuria unrelatwed to coitus or UTI then rec, Cysto  Patient informed will be contacted with results within 2 weeks. Encouraged to please call back or email if she has not heard from us by then.

## 2024-06-12 ENCOUNTER — OFFICE VISIT (OUTPATIENT)
Dept: OBSTETRICS AND GYNECOLOGY | Facility: CLINIC | Age: 59
End: 2024-06-12
Payer: COMMERCIAL

## 2024-06-12 VITALS — BODY MASS INDEX: 32.2 KG/M2 | DIASTOLIC BLOOD PRESSURE: 78 MMHG | WEIGHT: 170.44 LBS | SYSTOLIC BLOOD PRESSURE: 136 MMHG

## 2024-06-12 DIAGNOSIS — R39.9 URINARY SYMPTOM OR SIGN: Primary | ICD-10-CM

## 2024-06-12 LAB
BILIRUBIN, UA POC OHS: NEGATIVE
BLOOD, UA POC OHS: NEGATIVE
CLARITY, UA POC OHS: CLEAR
COLOR, UA POC OHS: YELLOW
GLUCOSE, UA POC OHS: NEGATIVE
KETONES, UA POC OHS: NEGATIVE
LEUKOCYTES, UA POC OHS: ABNORMAL
NITRITE, UA POC OHS: NEGATIVE
PH, UA POC OHS: 7
PROTEIN, UA POC OHS: NEGATIVE
SPECIFIC GRAVITY, UA POC OHS: 1.01
UROBILINOGEN, UA POC OHS: 0.2

## 2024-06-12 PROCEDURE — 1160F RVW MEDS BY RX/DR IN RCRD: CPT | Mod: CPTII,S$GLB,, | Performed by: OBSTETRICS & GYNECOLOGY

## 2024-06-12 PROCEDURE — 3008F BODY MASS INDEX DOCD: CPT | Mod: CPTII,S$GLB,, | Performed by: OBSTETRICS & GYNECOLOGY

## 2024-06-12 PROCEDURE — 3075F SYST BP GE 130 - 139MM HG: CPT | Mod: CPTII,S$GLB,, | Performed by: OBSTETRICS & GYNECOLOGY

## 2024-06-12 PROCEDURE — 3078F DIAST BP <80 MM HG: CPT | Mod: CPTII,S$GLB,, | Performed by: OBSTETRICS & GYNECOLOGY

## 2024-06-12 PROCEDURE — 99999 PR PBB SHADOW E&M-EST. PATIENT-LVL III: CPT | Mod: PBBFAC,,, | Performed by: OBSTETRICS & GYNECOLOGY

## 2024-06-12 PROCEDURE — 1159F MED LIST DOCD IN RCRD: CPT | Mod: CPTII,S$GLB,, | Performed by: OBSTETRICS & GYNECOLOGY

## 2024-06-12 PROCEDURE — 99213 OFFICE O/P EST LOW 20 MIN: CPT | Mod: S$GLB,,, | Performed by: OBSTETRICS & GYNECOLOGY

## 2024-06-12 PROCEDURE — 87086 URINE CULTURE/COLONY COUNT: CPT | Performed by: OBSTETRICS & GYNECOLOGY

## 2024-06-12 PROCEDURE — 81003 URINALYSIS AUTO W/O SCOPE: CPT | Mod: QW,S$GLB,, | Performed by: OBSTETRICS & GYNECOLOGY

## 2024-06-12 NOTE — PROGRESS NOTES
Chief Complaint   Patient presents with    Follow-up     To urinary symptoms and UA       History of Present Illness   59 y.o. F   patient presents today for follow up blood seen on UA.  Pt was treated for UTI at Urgent Care, urine culture was negative, but microscopic hematuria was noted.  Ot completed abx therapy and is having no symptoms and has not seen any hematuria    Past medical and surgical history reviewed.   I have reviewed the patient's medical history in detail and updated the computerized patient record.    Review of patient's allergies indicates:  No Known Allergies      Review of Systems -   GEN ROS: negative for - fever  Breast ROS: negative for breast lumps  Genito-Urinary ROS: negative for - urinary frequency/urgency      Physical Examination:  /78   Wt 77.3 kg (170 lb 6.7 oz)   LMP 2012   BMI 32.20 kg/m²          Assessment:  UA- no blood  1. Urinary symptom or sign  POCT Urinalysis(Instrument)      Urinary symptoms resolved post abx tx    Plan:  UA neg  C&S  Follow up annual in Nov. Sooner if any urinary symp  Patient informed will be contacted with results within 2 weeks. Encouraged to please call back or email if she has not heard from us by then.

## 2024-06-14 LAB — BACTERIA UR CULT: NORMAL

## 2024-11-15 ENCOUNTER — LAB VISIT (OUTPATIENT)
Dept: LAB | Facility: HOSPITAL | Age: 59
End: 2024-11-15
Attending: FAMILY MEDICINE
Payer: COMMERCIAL

## 2024-11-15 ENCOUNTER — OFFICE VISIT (OUTPATIENT)
Dept: FAMILY MEDICINE | Facility: CLINIC | Age: 59
End: 2024-11-15
Payer: COMMERCIAL

## 2024-11-15 VITALS
BODY MASS INDEX: 32.03 KG/M2 | DIASTOLIC BLOOD PRESSURE: 80 MMHG | WEIGHT: 169.56 LBS | SYSTOLIC BLOOD PRESSURE: 118 MMHG

## 2024-11-15 DIAGNOSIS — Z00.00 PREVENTATIVE HEALTH CARE: ICD-10-CM

## 2024-11-15 DIAGNOSIS — Z00.00 PREVENTATIVE HEALTH CARE: Primary | ICD-10-CM

## 2024-11-15 DIAGNOSIS — Z86.0100 PERSONAL HISTORY OF COLON POLYPS, UNSPECIFIED: ICD-10-CM

## 2024-11-15 LAB
25(OH)D3+25(OH)D2 SERPL-MCNC: 43 NG/ML (ref 30–96)
ALBUMIN SERPL BCP-MCNC: 4.1 G/DL (ref 3.5–5.2)
ALP SERPL-CCNC: 89 U/L (ref 40–150)
ALT SERPL W/O P-5'-P-CCNC: 14 U/L (ref 10–44)
ANION GAP SERPL CALC-SCNC: 9 MMOL/L (ref 8–16)
AST SERPL-CCNC: 16 U/L (ref 10–40)
BASOPHILS # BLD AUTO: 0.06 K/UL (ref 0–0.2)
BASOPHILS NFR BLD: 0.8 % (ref 0–1.9)
BILIRUB SERPL-MCNC: 0.4 MG/DL (ref 0.1–1)
BUN SERPL-MCNC: 11 MG/DL (ref 6–20)
CALCIUM SERPL-MCNC: 10.3 MG/DL (ref 8.7–10.5)
CHLORIDE SERPL-SCNC: 108 MMOL/L (ref 95–110)
CHOLEST SERPL-MCNC: 238 MG/DL (ref 120–199)
CHOLEST/HDLC SERPL: 4 {RATIO} (ref 2–5)
CO2 SERPL-SCNC: 28 MMOL/L (ref 23–29)
CREAT SERPL-MCNC: 0.7 MG/DL (ref 0.5–1.4)
DIFFERENTIAL METHOD BLD: ABNORMAL
EOSINOPHIL # BLD AUTO: 0.1 K/UL (ref 0–0.5)
EOSINOPHIL NFR BLD: 1.1 % (ref 0–8)
ERYTHROCYTE [DISTWIDTH] IN BLOOD BY AUTOMATED COUNT: 13.2 % (ref 11.5–14.5)
EST. GFR  (NO RACE VARIABLE): >60 ML/MIN/1.73 M^2
ESTIMATED AVG GLUCOSE: 103 MG/DL (ref 68–131)
GLUCOSE SERPL-MCNC: 99 MG/DL (ref 70–110)
HBA1C MFR BLD: 5.2 % (ref 4–5.6)
HCT VFR BLD AUTO: 39.3 % (ref 37–48.5)
HDLC SERPL-MCNC: 59 MG/DL (ref 40–75)
HDLC SERPL: 24.8 % (ref 20–50)
HGB BLD-MCNC: 13.4 G/DL (ref 12–16)
IMM GRANULOCYTES # BLD AUTO: 0.01 K/UL (ref 0–0.04)
IMM GRANULOCYTES NFR BLD AUTO: 0.1 % (ref 0–0.5)
LDLC SERPL CALC-MCNC: 156.6 MG/DL (ref 63–159)
LYMPHOCYTES # BLD AUTO: 2.6 K/UL (ref 1–4.8)
LYMPHOCYTES NFR BLD: 34.7 % (ref 18–48)
MCH RBC QN AUTO: 31.5 PG (ref 27–31)
MCHC RBC AUTO-ENTMCNC: 34.1 G/DL (ref 32–36)
MCV RBC AUTO: 93 FL (ref 82–98)
MONOCYTES # BLD AUTO: 0.4 K/UL (ref 0.3–1)
MONOCYTES NFR BLD: 5.1 % (ref 4–15)
NEUTROPHILS # BLD AUTO: 4.3 K/UL (ref 1.8–7.7)
NEUTROPHILS NFR BLD: 58.2 % (ref 38–73)
NONHDLC SERPL-MCNC: 179 MG/DL
NRBC BLD-RTO: 0 /100 WBC
PLATELET # BLD AUTO: 311 K/UL (ref 150–450)
PMV BLD AUTO: 11.1 FL (ref 9.2–12.9)
POTASSIUM SERPL-SCNC: 4.3 MMOL/L (ref 3.5–5.1)
PROT SERPL-MCNC: 7.9 G/DL (ref 6–8.4)
RBC # BLD AUTO: 4.25 M/UL (ref 4–5.4)
SODIUM SERPL-SCNC: 145 MMOL/L (ref 136–145)
TRIGL SERPL-MCNC: 112 MG/DL (ref 30–150)
WBC # BLD AUTO: 7.38 K/UL (ref 3.9–12.7)

## 2024-11-15 PROCEDURE — 80053 COMPREHEN METABOLIC PANEL: CPT | Performed by: FAMILY MEDICINE

## 2024-11-15 PROCEDURE — 99999 PR PBB SHADOW E&M-EST. PATIENT-LVL III: CPT | Mod: PBBFAC,,, | Performed by: FAMILY MEDICINE

## 2024-11-15 PROCEDURE — 80061 LIPID PANEL: CPT | Performed by: FAMILY MEDICINE

## 2024-11-15 PROCEDURE — 82306 VITAMIN D 25 HYDROXY: CPT | Performed by: FAMILY MEDICINE

## 2024-11-15 PROCEDURE — 85025 COMPLETE CBC W/AUTO DIFF WBC: CPT | Performed by: FAMILY MEDICINE

## 2024-11-15 PROCEDURE — 83036 HEMOGLOBIN GLYCOSYLATED A1C: CPT | Performed by: FAMILY MEDICINE

## 2024-11-15 NOTE — PROGRESS NOTES
Dictation #1  MRN:[id number]  CSN:[id number] Subjective:       Patient ID: Cassandra Francis is a 59 y.o. female.    Chief Complaint: Annual Exam      Pt here for annual visit.      Pt follows with gynecologist, Dr. Dev Valencia for pap and mammogram.    No past medical history on file.    Exercising some walking.    Past Surgical History:     SECTION, CLASSIC                                      No Known Allergies    Social History    Marital Status:                       Occupational History  Occupation          Employer            Comment               not employed                                Social History Main Topics    Smoking Status: Never Smoker                      Smokeless Status: Never Used                        Alcohol Use: No              Drug Use: No              Sexual Activity: Not on file          No current outpatient medications on file prior to visit.  No current facility-administered medications on file prior to visit.    No family history on file.        Review of Systems   Constitutional:  Negative for activity change, appetite change, fatigue and unexpected weight change.   HENT:  Negative for congestion, dental problem, ear pain, hearing loss, nosebleeds, postnasal drip, sinus pressure and tinnitus.    Eyes:  Negative for pain, discharge and visual disturbance.   Respiratory:  Negative for cough, choking, chest tightness, shortness of breath and wheezing.    Cardiovascular:  Negative for chest pain, palpitations and leg swelling.   Gastrointestinal:  Negative for abdominal distention, abdominal pain, blood in stool, constipation, diarrhea, nausea and vomiting.             Genitourinary: Negative.    Musculoskeletal:  Positive for arthralgias (left shoulder). Negative for back pain, gait problem, joint swelling and myalgias.   Skin:  Negative for color change, pallor, rash and wound.   Neurological:  Negative for dizziness, tremors, syncope, weakness, light-headedness,  numbness and headaches.   Hematological:  Negative for adenopathy. Does not bruise/bleed easily.   Psychiatric/Behavioral:  Negative for agitation, confusion, dysphoric mood and sleep disturbance. The patient is not nervous/anxious.            Objective:      /80 (BP Location: Right arm, Patient Position: Sitting)   Wt 76.9 kg (169 lb 8.5 oz)   LMP 05/14/2012   BMI 32.03 kg/m²     Physical Exam  Constitutional:       Appearance: Normal appearance. She is well-developed.   HENT:      Head: Normocephalic.      Mouth/Throat:      Pharynx: No oropharyngeal exudate or posterior oropharyngeal erythema.   Eyes:      Conjunctiva/sclera: Conjunctivae normal.      Pupils: Pupils are equal, round, and reactive to light.   Neck:      Thyroid: No thyromegaly.   Cardiovascular:      Rate and Rhythm: Normal rate and regular rhythm.      Heart sounds: Normal heart sounds, S1 normal and S2 normal. No murmur heard.     No friction rub. No gallop.   Pulmonary:      Effort: Pulmonary effort is normal.      Breath sounds: Normal breath sounds. No wheezing or rales.   Abdominal:      General: Bowel sounds are normal. There is no distension.      Palpations: Abdomen is soft. There is no mass.      Tenderness: There is no abdominal tenderness. There is no guarding or rebound.   Musculoskeletal:      Left shoulder: Tenderness present. No deformity. Decreased range of motion.      Cervical back: Normal range of motion and neck supple.      Right lower leg: No edema.      Left lower leg: No edema.   Lymphadenopathy:      Cervical: No cervical adenopathy.   Skin:     General: Skin is warm.      Findings: No rash.   Neurological:      Mental Status: She is alert and oriented to person, place, and time.      Cranial Nerves: No cranial nerve deficit.      Gait: Gait normal.         Results for orders placed or performed in visit on 06/12/24   POCT Urinalysis(Instrument)    Collection Time: 06/12/24  2:44 PM   Result Value Ref Range     Color, POC UA Yellow Yellow, Straw, Colorless    Clarity, POC UA Clear Clear    Glucose, POC UA Negative Negative    Bilirubin, POC UA Negative Negative    Ketones, POC UA Negative Negative    Spec Grav POC UA 1.010 1.005 - 1.030    Blood, POC UA Negative Negative    pH, POC UA 7.0 5.0 - 8.0    Protein, POC UA Negative Negative    Urobilinogen, POC UA 0.2 <=1.0    Nitrite, POC UA Negative Negative    WBC, POC UA Small (A) Negative   CULTURE, URINE    Collection Time: 06/12/24  2:52 PM    Specimen: Urine, Clean Catch   Result Value Ref Range    Urine Culture, Routine No significant growth          Assessment:          Plan:        Cassandra was seen today for annual exam.    Diagnoses and all orders for this visit:    Preventative health care  -     Vitamin D; Future  -     Hemoglobin A1C; Future  -     Lipid Panel; Future  -     Comprehensive Metabolic Panel; Future  -     CBC Auto Differential; Future    Personal history of colon polyps, unspecified  -     Case Request Endoscopy: COLONOSCOPY        labs today  Counseled on regular exercise, maintenance of a healthy weight, balanced diet rich in fruits/vegetables and lean protein, and avoidance of unhealthy habits like smoking and excessive alcohol intake.  F/u 1 year

## 2024-11-19 ENCOUNTER — TELEPHONE (OUTPATIENT)
Dept: OBSTETRICS AND GYNECOLOGY | Facility: CLINIC | Age: 59
End: 2024-11-19
Payer: COMMERCIAL

## 2024-12-10 ENCOUNTER — OFFICE VISIT (OUTPATIENT)
Dept: OBSTETRICS AND GYNECOLOGY | Facility: CLINIC | Age: 59
End: 2024-12-10
Payer: COMMERCIAL

## 2024-12-10 ENCOUNTER — HOSPITAL ENCOUNTER (OUTPATIENT)
Dept: RADIOLOGY | Facility: HOSPITAL | Age: 59
Discharge: HOME | End: 2024-12-10
Attending: OBSTETRICS & GYNECOLOGY
Payer: COMMERCIAL

## 2024-12-10 VITALS — BODY MASS INDEX: 32.41 KG/M2 | SYSTOLIC BLOOD PRESSURE: 110 MMHG | DIASTOLIC BLOOD PRESSURE: 78 MMHG | WEIGHT: 171.5 LBS

## 2024-12-10 DIAGNOSIS — Z01.419 ENCOUNTER FOR ANNUAL ROUTINE GYNECOLOGICAL EXAMINATION: Primary | ICD-10-CM

## 2024-12-10 DIAGNOSIS — N89.8 VAGINAL DISCHARGE: ICD-10-CM

## 2024-12-10 DIAGNOSIS — Z12.31 ENCOUNTER FOR SCREENING MAMMOGRAM FOR MALIGNANT NEOPLASM OF BREAST: ICD-10-CM

## 2024-12-10 PROCEDURE — 77067 SCR MAMMO BI INCL CAD: CPT | Mod: 26,,, | Performed by: RADIOLOGY

## 2024-12-10 PROCEDURE — 0352U VAGINOSIS SCREEN BY DNA PROBE: CPT | Performed by: OBSTETRICS & GYNECOLOGY

## 2024-12-10 PROCEDURE — 77063 BREAST TOMOSYNTHESIS BI: CPT | Mod: TC,PN

## 2024-12-10 PROCEDURE — 3008F BODY MASS INDEX DOCD: CPT | Mod: CPTII,S$GLB,, | Performed by: OBSTETRICS & GYNECOLOGY

## 2024-12-10 PROCEDURE — 99999 PR PBB SHADOW E&M-EST. PATIENT-LVL III: CPT | Mod: PBBFAC,,, | Performed by: OBSTETRICS & GYNECOLOGY

## 2024-12-10 PROCEDURE — 77063 BREAST TOMOSYNTHESIS BI: CPT | Mod: 26,,, | Performed by: RADIOLOGY

## 2024-12-10 PROCEDURE — 3078F DIAST BP <80 MM HG: CPT | Mod: CPTII,S$GLB,, | Performed by: OBSTETRICS & GYNECOLOGY

## 2024-12-10 PROCEDURE — 3074F SYST BP LT 130 MM HG: CPT | Mod: CPTII,S$GLB,, | Performed by: OBSTETRICS & GYNECOLOGY

## 2024-12-10 PROCEDURE — 3044F HG A1C LEVEL LT 7.0%: CPT | Mod: CPTII,S$GLB,, | Performed by: OBSTETRICS & GYNECOLOGY

## 2024-12-10 PROCEDURE — 1159F MED LIST DOCD IN RCRD: CPT | Mod: CPTII,S$GLB,, | Performed by: OBSTETRICS & GYNECOLOGY

## 2024-12-10 PROCEDURE — 99396 PREV VISIT EST AGE 40-64: CPT | Mod: S$GLB,,, | Performed by: OBSTETRICS & GYNECOLOGY

## 2024-12-10 PROCEDURE — 1160F RVW MEDS BY RX/DR IN RCRD: CPT | Mod: CPTII,S$GLB,, | Performed by: OBSTETRICS & GYNECOLOGY

## 2024-12-10 NOTE — PROGRESS NOTES
Chief Complaint   Patient presents with    Annual Exam       History and Physical:  Patient's last menstrual period was 2012.       Cassandra Francis is a 59 y.o.  IFwho presents today for her routine annual GYN exam. The patient has no Gynecology complaints today. Vaginal discharge, no bleeding , no odor      Allergies: Review of patient's allergies indicates:  No Known Allergies    Past Medical History:   Diagnosis Date    Colon polyp     Diverticulosis        Past Surgical History:   Procedure Laterality Date     SECTION, CLASSIC         MEDS:   Current Outpatient Medications on File Prior to Visit   Medication Sig Dispense Refill    calcium carbonate (OS-DIANA) 600 mg calcium (1,500 mg) Tab Take 600 mg by mouth once.      cranberry 400 mg Cap Take by mouth.      multivit-min/iron/FA/vit K/lut (CENTRUM SILVER WOMEN ORAL) Take by mouth.       No current facility-administered medications on file prior to visit.       OB History          1    Para   1    Term   1            AB        Living             SAB        IAB        Ectopic        Multiple        Live Births                     Social History     Socioeconomic History    Marital status:     Number of children: 1   Occupational History    Occupation: not employed   Tobacco Use    Smoking status: Never    Smokeless tobacco: Never   Substance and Sexual Activity    Alcohol use: No    Drug use: No    Sexual activity: Not Currently   Social History Narrative    From [place]       Family History   Problem Relation Name Age of Onset    Hyperlipidemia Sister      Multiple myeloma Sister      Breast cancer Neg Hx      Diabetes Neg Hx      Ovarian cancer Neg Hx      Uterine cancer Neg Hx      Cervical cancer Neg Hx           Past medical and surgical history reviewed.   I have reviewed the patient's medical history in detail and updated the computerized patient record.        Review of System:   General: no chills, fever, night  sweats, weight gain or weight loss  Psychological: no depression or suicidal ideation  Breasts: no new or changing breast lumps, nipple discharge or masses.  Respiratory: no cough, shortness of breath, or wheezing  Cardiovascular: no chest pain or dyspnea on exertion  Gastrointestinal: no abdominal pain, change in bowel habits, or black or bloody stools  Genito-Urinary: no incontinence, urinary frequency/urgency or vulvar/vaginal symptoms, pelvic pain or abnormal vaginal bleeding.  Musculoskeletal: no gait disturbance or muscular weakness      Physical Exam:   /78 (BP Location: Right arm, Patient Position: Sitting)   Wt 77.8 kg (171 lb 8.3 oz)   LMP 05/14/2012   BMI 32.41 kg/m²   Constitutional: She appears alert and responsive. She appears well-developed, well-groomed, and well-nourished. No distress.   HENT:   Head: Normocephalic and atraumatic.   Eyes: Conjunctivae and EOM are normal. No scleral icterus.   Neck: Symmetrical. Normal range of motion. Neck supple. No tracheal deviation present. THYROID:  without masses or tenderness.  Cardiovascular: Normal rate, no rhythm abnormality noted. Extremities without swelling or edema, warm.    Pulmonary/Chest: Normal respiratory Effort. No distress or retractions. She exhibits no tenderness.  Breasts: are symmetrical.no masses   Right breast exhibits no inverted nipple, no mass, no nipple discharge, no skin change and no tenderness.   Left breast exhibits no inverted nipple, no mass, no nipple discharge, no skin change and no tenderness.  Abdominal: Soft. She exhibits no distension, hernias or masses. There is no tenderness. No enlargement of liver edge or spleen.  There is no rebound and no guarding.   Genitourinary:    External rectal exam shows no thrombosed external hemorrhoids, no lesions.     Pelvic exam was performed with patient supine.   No labial fusion, and symmetrical.    There is no rash, lesion or injury on the right labia.   There is no rash,  lesion or injury on the left labia.   No bleeding and no signs of injury around the vaginal introitus, urethral meatus is normal size and without prolapse or lesions, urethra well supported. The cervix is visualized with no discharge, lesions or friability.    vaginal discharge found. clear   No significant Cystocele, Enterocele or rectocele, and cervix and uterus well supported.   Bimanual exam:   The urethra is normal to palpation and there are no palpable vaginal wall masses.   Uterus is not deviated, not enlarged, not fixed, normal shape and not tender.   Cervix exhibits no motion tenderness.    Right adnexum displays no mass or nodularity and no tenderness.   Left adnexum displays no mass or nodularity and no tenderness.  Musculoskeletal: Normal range of motion.   Lymphadenopathy: No inguinal adenopathy present.   Neurological: She is alert and oriented to person, place, and time. Coordination normal.   Skin: Skin is warm and dry. She is not diaphoretic. No rashes, lesions or ulcers.   Psychiatric: She has a normal mood and affect, oriented to person, place, and time.      Assessment:   Normal annual GYN exam  1. Encounter for annual routine gynecological examination  Liquid-Based Pap Smear, Screening      2. Encounter for screening mammogram for malignant neoplasm of breast  Mammo Digital Screening Bilat w/ Maged      3. Vaginal discharge  Vaginosis Screen by DNA Probe            Plan:   PAP  Mammogram  Vaginosis test  Follow up in 1 year.  Patient informed will be contacted with results within 2 weeks. Encouraged to please call back or email if she has not heard from us by then.

## 2024-12-11 ENCOUNTER — TELEPHONE (OUTPATIENT)
Dept: GASTROENTEROLOGY | Facility: CLINIC | Age: 59
End: 2024-12-11
Payer: COMMERCIAL

## 2024-12-16 ENCOUNTER — PATIENT MESSAGE (OUTPATIENT)
Dept: OBSTETRICS AND GYNECOLOGY | Facility: CLINIC | Age: 59
End: 2024-12-16
Payer: COMMERCIAL

## 2025-01-08 ENCOUNTER — TELEPHONE (OUTPATIENT)
Dept: GASTROENTEROLOGY | Facility: CLINIC | Age: 60
End: 2025-01-08
Payer: COMMERCIAL